# Patient Record
Sex: MALE | Race: WHITE | NOT HISPANIC OR LATINO | URBAN - METROPOLITAN AREA
[De-identification: names, ages, dates, MRNs, and addresses within clinical notes are randomized per-mention and may not be internally consistent; named-entity substitution may affect disease eponyms.]

---

## 2017-01-13 ENCOUNTER — OUTSIDE FACILITY SERVICE (OUTPATIENT)
Dept: SURGERY | Facility: CLINIC | Age: 27
End: 2017-01-13

## 2017-01-13 ENCOUNTER — LAB REQUISITION (OUTPATIENT)
Dept: LAB | Facility: HOSPITAL | Age: 27
End: 2017-01-13

## 2017-01-13 DIAGNOSIS — Z12.11 ENCOUNTER FOR SCREENING FOR MALIGNANT NEOPLASM OF COLON: ICD-10-CM

## 2017-01-13 PROCEDURE — 88305 TISSUE EXAM BY PATHOLOGIST: CPT | Performed by: SURGERY

## 2017-01-13 PROCEDURE — 45385 COLONOSCOPY W/LESION REMOVAL: CPT | Performed by: SURGERY

## 2017-01-16 LAB
CYTO UR: NORMAL
LAB AP CASE REPORT: NORMAL
Lab: NORMAL
PATH REPORT.FINAL DX SPEC: NORMAL
PATH REPORT.GROSS SPEC: NORMAL

## 2017-09-01 ENCOUNTER — TELEPHONE (OUTPATIENT)
Dept: INTERNAL MEDICINE | Facility: CLINIC | Age: 27
End: 2017-09-01

## 2017-09-01 DIAGNOSIS — Z00.00 HEALTHCARE MAINTENANCE: Primary | ICD-10-CM

## 2017-09-01 LAB
ALBUMIN SERPL-MCNC: 4.9 G/DL (ref 3.5–5.2)
ALBUMIN/GLOB SERPL: 1.8 G/DL
ALP SERPL-CCNC: 54 U/L (ref 39–117)
ALT SERPL-CCNC: 46 U/L (ref 1–41)
AST SERPL-CCNC: 23 U/L (ref 1–40)
BILIRUB SERPL-MCNC: 1.3 MG/DL (ref 0.1–1.2)
BUN SERPL-MCNC: 12 MG/DL (ref 6–20)
BUN/CREAT SERPL: 14.6 (ref 7–25)
CALCIUM SERPL-MCNC: 10.5 MG/DL (ref 8.6–10.5)
CHLORIDE SERPL-SCNC: 104 MMOL/L (ref 98–107)
CHOLEST SERPL-MCNC: 201 MG/DL (ref 0–200)
CO2 SERPL-SCNC: 24 MMOL/L (ref 22–29)
CREAT SERPL-MCNC: 0.82 MG/DL (ref 0.76–1.27)
GLOBULIN SER CALC-MCNC: 2.7 GM/DL
GLUCOSE SERPL-MCNC: 95 MG/DL (ref 65–99)
HDLC SERPL-MCNC: 29 MG/DL (ref 40–60)
LDLC SERPL CALC-MCNC: 143 MG/DL (ref 0–100)
LDLC/HDLC SERPL: 4.93 {RATIO}
POTASSIUM SERPL-SCNC: 4.4 MMOL/L (ref 3.5–5.2)
PROT SERPL-MCNC: 7.6 G/DL (ref 6–8.5)
SODIUM SERPL-SCNC: 143 MMOL/L (ref 136–145)
TRIGL SERPL-MCNC: 145 MG/DL (ref 0–150)
TSH SERPL DL<=0.005 MIU/L-ACNC: 1.54 MIU/ML (ref 0.27–4.2)
VLDLC SERPL CALC-MCNC: 29 MG/DL (ref 5–40)

## 2017-09-01 NOTE — TELEPHONE ENCOUNTER
LABS ORDERED    ----- Message from Jolly Dia MD sent at 8/31/2017  5:17 PM EDT -----  cmp flp tsh  ----- Message -----     From: Luana Cope MA     Sent: 8/31/2017   5:07 PM       To: Jolly Dia MD    PT SCHEDULED FOR LABS PLEASE ADVISE

## 2017-09-07 ENCOUNTER — OFFICE VISIT (OUTPATIENT)
Dept: INTERNAL MEDICINE | Facility: CLINIC | Age: 27
End: 2017-09-07

## 2017-09-07 ENCOUNTER — TELEPHONE (OUTPATIENT)
Dept: INTERNAL MEDICINE | Facility: CLINIC | Age: 27
End: 2017-09-07

## 2017-09-07 VITALS
SYSTOLIC BLOOD PRESSURE: 122 MMHG | WEIGHT: 244.5 LBS | HEART RATE: 91 BPM | OXYGEN SATURATION: 96 % | BODY MASS INDEX: 39.29 KG/M2 | HEIGHT: 66 IN | DIASTOLIC BLOOD PRESSURE: 68 MMHG

## 2017-09-07 DIAGNOSIS — Z00.00 HEALTH CARE MAINTENANCE: ICD-10-CM

## 2017-09-07 DIAGNOSIS — Z72.0 TOBACCO ABUSE: ICD-10-CM

## 2017-09-07 DIAGNOSIS — K57.30 DIVERTICULOSIS OF LARGE INTESTINE WITHOUT HEMORRHAGE: Primary | ICD-10-CM

## 2017-09-07 DIAGNOSIS — E66.9 OBESITY (BMI 30-39.9): ICD-10-CM

## 2017-09-07 PROCEDURE — 99213 OFFICE O/P EST LOW 20 MIN: CPT | Performed by: INTERNAL MEDICINE

## 2017-09-07 PROCEDURE — 99395 PREV VISIT EST AGE 18-39: CPT | Performed by: INTERNAL MEDICINE

## 2017-09-07 RX ORDER — BUPROPION HCL 300 MG
300 TABLET, EXTENDED RELEASE 24 HR ORAL DAILY
Qty: 30 TABLET | Refills: 5 | Status: SHIPPED | OUTPATIENT
Start: 2017-09-07 | End: 2018-10-10

## 2017-09-07 NOTE — TELEPHONE ENCOUNTER
PT AWARE    ----- Message from Jolly Dia MD sent at 9/7/2017 10:18 AM EDT -----  Let patient know.    ----- Message -----     From: Kelli Ibarra MD     Sent: 9/7/2017  10:03 AM       To: Jolly Dia MD    He could wait 3 years  It was ulcerated   ----- Message -----     From: Jolly Dia MD     Sent: 9/7/2017   9:10 AM       To: Kelli Ibarra MD    If you get a chance could you review this and see if he really needs to repeat in 2 years  Looks like the path on the polyp was mostly granulation tissue.  Just let me know    Thanks  Jolly

## 2017-09-07 NOTE — PROGRESS NOTES
"Subjective   Santosh Giordano is a 27 y.o. male and is here for a comprehensive physical exam. The patient reports problems - Obesity.    Patient is really been struggling with weight loss.  His wife cooks healthy food but when he is at work he tends to eat out a lot.  He has had weight problems his whole life  Patient also struggles with use of tobacco.  He is currently smoking 1 pack every 2-3 days.  He would like to quit this as well.   Patient was recently treated with antibiotics for diverticulitis.  He had a subsequent colonoscopy which showed a granuloma rather than a polyp.  He was told he needed a repeat colonoscopy in 20    Do you take any herbs or supplements that were not prescribed by a doctor?  Probiotics and Metamucil      Social History: Patient is seen without a lot during the day.  No regular exercise  Social History     Social History   • Marital status:      Spouse name: Lashawn Giordano   • Number of children: 0   • Years of education: N/A     Occupational History   •       Social History Main Topics   • Smoking status: Former Smoker     Packs/day: 0.25     Quit date: 7/22/2016   • Smokeless tobacco: Not on file   • Alcohol use Yes      Comment: social   • Drug use: No   • Sexual activity: Not on file     Other Topics Concern   • Not on file     Social History Narrative       Family History:   Family History   Problem Relation Age of Onset   • Heart failure Mother    • Kidney disease Mother    • Diabetes Mother    • Thyroid disease Father    • Heart disease Father    • Cancer Paternal Grandmother      breast       Past Medical History:   Past Medical History:   Diagnosis Date   • Headache            Review of Systems    A comprehensive review of systems was negative.    Objective   /68 (BP Location: Left arm, Patient Position: Sitting, Cuff Size: Thigh Adult)  Pulse 91  Ht 66\" (167.6 cm)  Wt 244 lb 8 oz (111 kg)  SpO2 96%  BMI 39.46 kg/m2    General Appearance:    Alert, " cooperative, no distress, appears stated age   Head:    Normocephalic, without obvious abnormality, atraumatic   Eyes:    PERRL, conjunctiva/corneas clear, EOM's intact, fundi     benign, both eyes        Ears:    Normal TM's and external ear canals, both ears   Nose:   Nares normal, septum midline, mucosa normal, no drainage    or sinus tenderness   Throat:   Lips, mucosa, and tongue normal; teeth and gums normal   Neck:   Supple, symmetrical, trachea midline, no adenopathy;        thyroid:  No enlargement/tenderness/nodules; no carotid    bruit or JVD   Back:     Symmetric, no curvature, ROM normal, no CVA tenderness   Lungs:     Clear to auscultation bilaterally, respirations unlabored   Chest wall:    No tenderness or deformity   Heart:    Regular rate and rhythm, S1 and S2 normal, no murmur, rub   or gallop   Abdomen:     Soft, non-tender, bowel sounds active all four quadrants,     no masses, no organomegaly           Extremities:   Extremities normal, atraumatic, no cyanosis or edema   Pulses:   2+ and symmetric all extremities   Skin:   Skin color, texture, turgor normal, no rashes or lesions   Lymph nodes:   Cervical, supraclavicular, and axillary nodes normal   Neurologic:   CNII-XII intact. Normal strength, sensation and reflexes       throughout         Medications:   Current Outpatient Prescriptions:   •  Probiotic Product (TRUBIOTICS PO), Take  by mouth., Disp: , Rfl:   •  psyllium (METAMUCIL) 58.6 % packet, Take 1 packet by mouth Daily., Disp: , Rfl:        Assessment/Plan   Healthy male exam.     1. Healthcare Maintenance:  2. Patient Counseling:  --Nutrition: Stressed importance of moderation in sodium/caffeine intake, saturated fat and cholesterol, caloric balance, sufficient intake of fresh fruits, vegetables, fiber, calcium and vit D  --Exercise: I have rec 30  Minutes a day  --Substance Abuse: smokes 1 pack every 2-3 days  --Dental health: He does Go to the dentist regularly  --Immunizations  reviewed.Up-to-date  --Discussed benefits of screening colonoscopy.There is some question as to whether or not he needs a repeat one in 2 years.  The polyp appeared to be mostly granulation tissue.  I'm sending a message to Dr. mims  3. Tob abuse- And really wants to try to quit smoking.  We discussed the options of Wellbutrin or Chantix.  We are going to go ahead and try Wellbutrin.  He'll start with 150 mg a day and then increase to 300.   4.  Obesity-patient is really going to work hard on diet and exercise.  He has good support at home from his wife.  We discussed male propping and eating more fruits and vegetables as well as streaking more water   4. HPL-again diet and exercises going to help with this

## 2017-09-07 NOTE — PATIENT INSTRUCTIONS

## 2018-03-23 PROBLEM — A09 DIARRHEA OF INFECTIOUS ORIGIN: Status: ACTIVE | Noted: 2018-03-23

## 2018-10-03 DIAGNOSIS — Z00.00 HEALTHCARE MAINTENANCE: Primary | ICD-10-CM

## 2018-10-03 LAB
ALBUMIN SERPL-MCNC: 4.6 G/DL (ref 3.5–5.2)
ALBUMIN/GLOB SERPL: 1.8 G/DL
ALP SERPL-CCNC: 61 U/L (ref 39–117)
ALT SERPL-CCNC: 39 U/L (ref 1–41)
AST SERPL-CCNC: 17 U/L (ref 1–40)
BILIRUB SERPL-MCNC: 1 MG/DL (ref 0.1–1.2)
BUN SERPL-MCNC: 10 MG/DL (ref 6–20)
BUN/CREAT SERPL: 11.1 (ref 7–25)
CALCIUM SERPL-MCNC: 9.6 MG/DL (ref 8.6–10.5)
CHLORIDE SERPL-SCNC: 103 MMOL/L (ref 98–107)
CHOLEST SERPL-MCNC: 196 MG/DL (ref 0–200)
CO2 SERPL-SCNC: 23.8 MMOL/L (ref 22–29)
CREAT SERPL-MCNC: 0.9 MG/DL (ref 0.76–1.27)
GLOBULIN SER CALC-MCNC: 2.5 GM/DL
GLUCOSE SERPL-MCNC: 99 MG/DL (ref 65–99)
HDLC SERPL-MCNC: 35 MG/DL (ref 40–60)
LDLC SERPL CALC-MCNC: 135 MG/DL (ref 0–100)
LDLC/HDLC SERPL: 3.87 {RATIO}
POTASSIUM SERPL-SCNC: 4.3 MMOL/L (ref 3.5–5.2)
PROT SERPL-MCNC: 7.1 G/DL (ref 6–8.5)
SODIUM SERPL-SCNC: 141 MMOL/L (ref 136–145)
TRIGL SERPL-MCNC: 128 MG/DL (ref 0–150)
TSH SERPL DL<=0.005 MIU/L-ACNC: 1.54 MIU/ML (ref 0.27–4.2)
VLDLC SERPL CALC-MCNC: 25.6 MG/DL (ref 5–40)

## 2018-10-10 ENCOUNTER — OFFICE VISIT (OUTPATIENT)
Dept: INTERNAL MEDICINE | Facility: CLINIC | Age: 28
End: 2018-10-10

## 2018-10-10 VITALS
HEIGHT: 66 IN | TEMPERATURE: 98.1 F | BODY MASS INDEX: 40.34 KG/M2 | SYSTOLIC BLOOD PRESSURE: 120 MMHG | OXYGEN SATURATION: 98 % | DIASTOLIC BLOOD PRESSURE: 82 MMHG | HEART RATE: 84 BPM | WEIGHT: 251 LBS

## 2018-10-10 DIAGNOSIS — K21.9 GASTROESOPHAGEAL REFLUX DISEASE, ESOPHAGITIS PRESENCE NOT SPECIFIED: ICD-10-CM

## 2018-10-10 DIAGNOSIS — Z00.00 HEALTH CARE MAINTENANCE: Primary | ICD-10-CM

## 2018-10-10 DIAGNOSIS — M54.2 NECK PAIN: ICD-10-CM

## 2018-10-10 PROCEDURE — 90471 IMMUNIZATION ADMIN: CPT | Performed by: INTERNAL MEDICINE

## 2018-10-10 PROCEDURE — 99395 PREV VISIT EST AGE 18-39: CPT | Performed by: INTERNAL MEDICINE

## 2018-10-10 PROCEDURE — 90674 CCIIV4 VAC NO PRSV 0.5 ML IM: CPT | Performed by: INTERNAL MEDICINE

## 2018-10-10 RX ORDER — OMEPRAZOLE 40 MG/1
40 CAPSULE, DELAYED RELEASE ORAL DAILY
Qty: 30 CAPSULE | Refills: 2 | Status: SHIPPED | OUTPATIENT
Start: 2018-10-10 | End: 2019-05-08

## 2018-10-10 NOTE — PROGRESS NOTES
"Subjective   Santosh Giordano is a 28 y.o. male and is here for a comprehensive physical exam. The patient reports problems - obesity.    HE is cont to have trouble with weight and smoking.    Pt has been having some epigastric pain mostly after meals    He does have neck and upper back pain  Related to work  Do you take any herbs or supplements that were not prescribed by a doctor? probiotic      Social History: 2 ppw  He does not exercise reg  Does not eat a healthy diet    Social History     Social History   • Marital status:      Spouse name: Lashawn Giordano   • Number of children: 0   • Years of education: N/A     Occupational History   •       Social History Main Topics   • Smoking status: Former Smoker     Packs/day: 0.25     Quit date: 7/22/2016   • Smokeless tobacco: Current User   • Alcohol use Yes      Comment: social   • Drug use: No   • Sexual activity: Not on file     Other Topics Concern   • Not on file     Social History Narrative   • No narrative on file       Family History:   Family History   Problem Relation Age of Onset   • Heart failure Mother    • Kidney disease Mother    • Diabetes Mother    • Thyroid disease Father    • Heart disease Father    • Cancer Paternal Grandmother         breast       Past Medical History:   Past Medical History:   Diagnosis Date   • Headache            Review of Systems    A comprehensive review of systems was negative.    Objective   /82 (BP Location: Left arm, Patient Position: Sitting, Cuff Size: Adult)   Pulse 84   Temp 98.1 °F (36.7 °C) (Oral)   Ht 167.6 cm (66\")   Wt 114 kg (251 lb)   SpO2 98%   BMI 40.51 kg/m²     General Appearance:    Alert, cooperative, no distress, appears stated age   Head:    Normocephalic, without obvious abnormality, atraumatic   Eyes:    PERRL, conjunctiva/corneas clear, EOM's intact, fundi     benign, both eyes        Ears:    Normal TM's and external ear canals, both ears   Nose:   Nares normal, septum " midline, mucosa normal, no drainage    or sinus tenderness   Throat:   Lips, mucosa, and tongue normal; teeth and gums normal   Neck:   Supple, symmetrical, trachea midline, no adenopathy;        thyroid:  No enlargement/tenderness/nodules; no carotid    bruit or JVD   Back:     Symmetric, no curvature, ROM normal, no CVA tenderness   Lungs:     Clear to auscultation bilaterally, respirations unlabored   Chest wall:    No tenderness or deformity   Heart:    Regular rate and rhythm, S1 and S2 normal, no murmur, rub   or gallop   Abdomen:     Soft, non-tender, bowel sounds active all four quadrants,     no masses, no organomegaly           Extremities:   Extremities normal, atraumatic, no cyanosis or edema   Pulses:   2+ and symmetric all extremities   Skin:   Skin color, texture, turgor normal, no rashes or lesions   Lymph nodes:   Cervical, supraclavicular, and axillary nodes normal   Neurologic:   CNII-XII intact. Normal strength, sensation and reflexes       throughout       Medications:   Current Outpatient Prescriptions:   •  Probiotic Product (TRUBIOTICS PO), Take  by mouth., Disp: , Rfl:   •  WELLBUTRIN  MG 24 hr tablet, Take 1 tablet by mouth Daily., Disp: 30 tablet, Rfl: 5       Assessment/Plan   Healthy male exam.      1. Healthcare Maintenance:  2. Patient Counseling:  --Nutrition: Stressed importance of moderation in sodium/caffeine intake, saturated fat and cholesterol, caloric balance, sufficient intake of fresh fruits, vegetables, fiber, calcium and vit D  --Exercise: he does not exercise  --Substance Abuse: he does smoke rare etoh  --Dental health: He does go to the dentist reg  --Immunizations reviewed.  --Discussed benefits of screening colonoscopy.  3.  GERD-  We are going to try a ppi  4.  NEck and upper back pain-  I have rec some exercises and referred to PT  5.  Tob abuse-  He is working on quitting

## 2019-05-08 ENCOUNTER — HOSPITAL ENCOUNTER (OUTPATIENT)
Dept: GENERAL RADIOLOGY | Facility: HOSPITAL | Age: 29
Discharge: HOME | End: 2019-05-08
Admitting: INTERNAL MEDICINE

## 2019-05-08 ENCOUNTER — OFFICE VISIT (OUTPATIENT)
Dept: INTERNAL MEDICINE | Facility: CLINIC | Age: 29
End: 2019-05-08

## 2019-05-08 VITALS
TEMPERATURE: 98.4 F | SYSTOLIC BLOOD PRESSURE: 122 MMHG | HEIGHT: 66 IN | OXYGEN SATURATION: 97 % | WEIGHT: 247.3 LBS | DIASTOLIC BLOOD PRESSURE: 82 MMHG | BODY MASS INDEX: 39.74 KG/M2 | HEART RATE: 108 BPM

## 2019-05-08 DIAGNOSIS — K21.9 GASTROESOPHAGEAL REFLUX DISEASE, ESOPHAGITIS PRESENCE NOT SPECIFIED: Primary | ICD-10-CM

## 2019-05-08 DIAGNOSIS — R07.1 CHEST PAIN ON BREATHING: ICD-10-CM

## 2019-05-08 PROCEDURE — 93000 ELECTROCARDIOGRAM COMPLETE: CPT | Performed by: INTERNAL MEDICINE

## 2019-05-08 PROCEDURE — 99214 OFFICE O/P EST MOD 30 MIN: CPT | Performed by: INTERNAL MEDICINE

## 2019-05-08 PROCEDURE — 71046 X-RAY EXAM CHEST 2 VIEWS: CPT

## 2019-05-08 RX ORDER — MELOXICAM 7.5 MG/1
7.5 TABLET ORAL DAILY
Qty: 30 TABLET | Refills: 0 | Status: SHIPPED | OUTPATIENT
Start: 2019-05-08 | End: 2019-11-06

## 2019-05-08 RX ORDER — OMEPRAZOLE 40 MG/1
40 CAPSULE, DELAYED RELEASE ORAL DAILY
Qty: 30 CAPSULE | Refills: 5 | Status: SHIPPED | OUTPATIENT
Start: 2019-05-08 | End: 2019-11-06

## 2019-05-08 NOTE — PROGRESS NOTES
Subjective   Santosh Giordano is a 28 y.o. male c/o sharp chest pain, sob on and off and rt shoulder pain X 2 weeks.    History of Present Illness   PT has had some int sharp CP and SOB int for the last couple weeks.  No wheezing   Worse with movement and breathing in and out  He is still smoking but he is trying to quit.  He does have daily reflux sx  He is not very compliant with his meds      The following portions of the patient's history were reviewed and updated as appropriate: allergies, current medications, past medical history, past social history and problem list.  Patient denies any change in diet.  He is not very compliant with his proton pump inhibitor.  He has been trying to quit smoking    Review of Systems   All other systems reviewed and are negative.      Objective   Physical Exam   Constitutional: He is oriented to person, place, and time. He appears well-developed and well-nourished.   HENT:   Head: Normocephalic and atraumatic.   Right Ear: External ear normal.   Left Ear: External ear normal.   Mouth/Throat: Oropharynx is clear and moist.   Eyes: Conjunctivae and EOM are normal. Pupils are equal, round, and reactive to light.   Neck: Normal range of motion. No tracheal deviation present. No thyromegaly present.   Cardiovascular: Normal rate, regular rhythm, normal heart sounds and intact distal pulses.   Pulmonary/Chest: Effort normal and breath sounds normal.   Abdominal: Soft. Bowel sounds are normal. He exhibits no distension. There is no tenderness.   Musculoskeletal: Normal range of motion. He exhibits no edema or deformity.   Some pain with palpation to the right lower sternum.  This is the same pain he has been having.  He also has the same pain when he takes a deep inspiration   Neurological: He is alert and oriented to person, place, and time.   Skin: Skin is warm and dry.   Psychiatric: He has a normal mood and affect. His behavior is normal. Judgment and thought content normal.   Vitals  reviewed.      Vitals:    05/08/19 0818   BP: 122/82   Pulse: 108   Temp: 98.4 °F (36.9 °C)   SpO2: 97%     Current Outpatient Medications:   •  Probiotic Product (TRUBIOTICS PO), Take  by mouth., Disp: , Rfl:      EKG-  NSR  No acute ST changes  No sig change from 2016      Assessment/Plan   Problems Addressed this Visit        Digestive    Acid reflux - Primary      Other Visit Diagnoses     Chest pain on breathing        Relevant Orders    XR Chest PA & Lateral    CBC & Differential    D-dimer, Quantitative          1.  Chest pain: EKG is normal.  O2 sat is completely normal.  We will go ahead and check some labs including a d-dimer today and a chest x-ray.  In the meantime we will go ahead and put him on reflux medication and anti-inflammatory.  I think this is a combination of acid reflux with either pleurisy or costochondritis

## 2019-05-22 LAB
BASOPHILS # BLD AUTO: 0.02 10E3/MM3 (ref 0–0.2)
BASOPHILS NFR BLD AUTO: 0.3 % (ref 0–1.5)
D DIMER PPP FEU-MCNC: <0.27 MCGFEU/ML (ref 0–0.49)
EOSINOPHIL # BLD AUTO: 0.3 10E3/MM3 (ref 0–0.4)
EOSINOPHIL NFR BLD AUTO: 4.2 % (ref 0.3–6.2)
ERYTHROCYTE [DISTWIDTH] IN BLOOD BY AUTOMATED COUNT: 12.9 % (ref 12.3–15.4)
HCT VFR BLD AUTO: 45.6 % (ref 37.5–51)
HGB BLD-MCNC: 16.1 G/DL (ref 13–17.7)
IMM GRANULOCYTES NFR BLD AUTO: 0.4 % (ref 0–0.5)
LYMPHOCYTES # BLD AUTO: 1.87 10E3/MM3 (ref 0.7–3.1)
LYMPHOCYTES NFR BLD AUTO: 26.2 % (ref 19.6–45.3)
MCH RBC QN AUTO: 32.2 PG (ref 26.6–33)
MCHC RBC AUTO-ENTMCNC: 35.3 G/DL (ref 31.5–35.7)
MCV RBC AUTO: 91.2 FL (ref 79–97)
MONOCYTES # BLD AUTO: 0.48 10E3/MM3 (ref 0.1–0.9)
MONOCYTES NFR BLD AUTO: 6.7 % (ref 5–12)
NEUTROPHILS # BLD AUTO: 4.45 10E3/MM3 (ref 1.7–7)
NEUTROPHILS NFR BLD AUTO: 62.2 % (ref 42.7–76)
PLATELET # BLD AUTO: 184 10E3/MM3 (ref 140–450)
RBC # BLD AUTO: 5 10E6/MM3 (ref 4.14–5.8)
WBC # BLD AUTO: 7.15 10E3/MM3 (ref 3.4–10.8)

## 2019-10-30 DIAGNOSIS — Z00.00 HEALTH CARE MAINTENANCE: Primary | ICD-10-CM

## 2019-10-31 LAB
ALBUMIN SERPL-MCNC: 4.6 G/DL (ref 3.5–5.2)
ALBUMIN/GLOB SERPL: 2.2 G/DL
ALP SERPL-CCNC: 64 U/L (ref 39–117)
ALT SERPL-CCNC: 100 U/L (ref 1–41)
AST SERPL-CCNC: 38 U/L (ref 1–40)
BILIRUB SERPL-MCNC: 0.8 MG/DL (ref 0.2–1.2)
BUN SERPL-MCNC: 10 MG/DL (ref 6–20)
BUN/CREAT SERPL: 12.5 (ref 7–25)
CALCIUM SERPL-MCNC: 9.4 MG/DL (ref 8.6–10.5)
CHLORIDE SERPL-SCNC: 105 MMOL/L (ref 98–107)
CHOLEST SERPL-MCNC: 182 MG/DL (ref 0–200)
CO2 SERPL-SCNC: 24.1 MMOL/L (ref 22–29)
CREAT SERPL-MCNC: 0.8 MG/DL (ref 0.76–1.27)
GLOBULIN SER CALC-MCNC: 2.1 GM/DL
GLUCOSE SERPL-MCNC: 96 MG/DL (ref 65–99)
HDLC SERPL-MCNC: 36 MG/DL (ref 40–60)
LDLC SERPL CALC-MCNC: 128 MG/DL (ref 0–100)
LDLC/HDLC SERPL: 3.56 {RATIO}
POTASSIUM SERPL-SCNC: 4.2 MMOL/L (ref 3.5–5.2)
PROT SERPL-MCNC: 6.7 G/DL (ref 6–8.5)
SODIUM SERPL-SCNC: 141 MMOL/L (ref 136–145)
TRIGL SERPL-MCNC: 90 MG/DL (ref 0–150)
TSH SERPL DL<=0.005 MIU/L-ACNC: 1.83 UIU/ML (ref 0.27–4.2)
VLDLC SERPL CALC-MCNC: 18 MG/DL

## 2019-11-06 ENCOUNTER — HOSPITAL ENCOUNTER (OUTPATIENT)
Dept: GENERAL RADIOLOGY | Facility: HOSPITAL | Age: 29
Discharge: HOME | End: 2019-11-06
Admitting: INTERNAL MEDICINE

## 2019-11-06 ENCOUNTER — OFFICE VISIT (OUTPATIENT)
Dept: INTERNAL MEDICINE | Facility: CLINIC | Age: 29
End: 2019-11-06

## 2019-11-06 VITALS
SYSTOLIC BLOOD PRESSURE: 124 MMHG | TEMPERATURE: 98.4 F | HEART RATE: 95 BPM | WEIGHT: 249.2 LBS | DIASTOLIC BLOOD PRESSURE: 76 MMHG | HEIGHT: 66 IN | BODY MASS INDEX: 40.05 KG/M2 | OXYGEN SATURATION: 95 %

## 2019-11-06 DIAGNOSIS — Z00.00 HEALTH CARE MAINTENANCE: Primary | ICD-10-CM

## 2019-11-06 DIAGNOSIS — M79.661 RIGHT CALF PAIN: ICD-10-CM

## 2019-11-06 DIAGNOSIS — R07.9 CHEST PAIN, UNSPECIFIED TYPE: ICD-10-CM

## 2019-11-06 PROBLEM — A09 DIARRHEA OF INFECTIOUS ORIGIN: Status: RESOLVED | Noted: 2018-03-23 | Resolved: 2019-11-06

## 2019-11-06 PROCEDURE — 90674 CCIIV4 VAC NO PRSV 0.5 ML IM: CPT | Performed by: INTERNAL MEDICINE

## 2019-11-06 PROCEDURE — 90471 IMMUNIZATION ADMIN: CPT | Performed by: INTERNAL MEDICINE

## 2019-11-06 PROCEDURE — 99395 PREV VISIT EST AGE 18-39: CPT | Performed by: INTERNAL MEDICINE

## 2019-11-06 PROCEDURE — 71046 X-RAY EXAM CHEST 2 VIEWS: CPT

## 2019-11-06 PROCEDURE — 99213 OFFICE O/P EST LOW 20 MIN: CPT | Performed by: INTERNAL MEDICINE

## 2019-11-06 RX ORDER — ALBUTEROL SULFATE 90 UG/1
2 AEROSOL, METERED RESPIRATORY (INHALATION) 2 TIMES DAILY
COMMUNITY
Start: 2019-10-24 | End: 2020-03-10

## 2019-11-06 NOTE — PROGRESS NOTES
"Subjective   Santosh Giordano is a 29 y.o. male and is here for a comprehensive physical exam. The patient reports problems - chest pain.    He was seen in the [facility] recently for a URI and he was treated with steroids and abx  He got better but since then he has had CP on the right side  No SOB not related to activty not related to deep insp   Anytime day or night it can occurred  No orthopnea no pnd no LE edema  No sig gerd sx  He has been having cramping in the right calf  He does say it can be worse after standing a log time  No swelling .        Do you take any herbs or supplements that were not prescribed by a doctor? Prn probiotic      Social History: no reg exercise  He has not been eating well as he does eat out a lot with work    Social History     Socioeconomic History   • Marital status:      Spouse name: Lashawn Giordano   • Number of children: 0   • Years of education: Not on file   • Highest education level: Not on file   Occupational History   • Occupation:    Tobacco Use   • Smoking status: Former Smoker     Packs/day: 0.25     Last attempt to quit: 7/22/2016     Years since quitting: 3.2   • Smokeless tobacco: Current User   Substance and Sexual Activity   • Alcohol use: Yes     Comment: social   • Drug use: No       Family History:   Family History   Problem Relation Age of Onset   • Heart failure Mother    • Kidney disease Mother    • Diabetes Mother    • Thyroid disease Father    • Heart disease Father    • Cancer Paternal Grandmother         breast       Past Medical History:   Past Medical History:   Diagnosis Date   • Headache            Review of Systems    A comprehensive review of systems was negative.    Objective   /76 (BP Location: Left arm, Patient Position: Sitting)   Pulse 95   Temp 98.4 °F (36.9 °C) (Oral)   Ht 167.6 cm (66\")   Wt 113 kg (249 lb 3.2 oz)   SpO2 95%   BMI 40.22 kg/m²     General Appearance:    Alert, cooperative, no distress, appears stated age "   Head:    Normocephalic, without obvious abnormality, atraumatic   Eyes:    PERRL, conjunctiva/corneas clear, EOM's intact, fundi     benign, both eyes        Ears:    Normal TM's and external ear canals, both ears   Nose:   Nares normal, septum midline, mucosa normal, no drainage    or sinus tenderness   Throat:   Lips, mucosa, and tongue normal; teeth and gums normal   Neck:   Supple, symmetrical, trachea midline, no adenopathy;        thyroid:  No enlargement/tenderness/nodules; no carotid    bruit or JVD   Back:     Symmetric, no curvature, ROM normal, no CVA tenderness   Lungs:     Clear to auscultation bilaterally, respirations unlabored   Chest wall:    No tenderness or deformity   Heart:    Regular rate and rhythm, S1 and S2 normal, no murmur, rub   or gallop   Abdomen:     Soft, non-tender, bowel sounds active all four quadrants,     no masses, no organomegaly           Extremities:   Extremities normal, atraumatic, no cyanosis or edema   Pulses:   2+ and symmetric all extremities   Skin:   Skin color, texture, turgor normal, no rashes or lesions   Lymph nodes:   Cervical, supraclavicular, and axillary nodes normal   Neurologic:   CNII-XII intact. Normal strength, sensation and reflexes       throughout       Medications:   Current Outpatient Medications:   •  Probiotic Product (TRUBIOTICS PO), Take  by mouth., Disp: , Rfl:   •  VENTOLIN  (90 Base) MCG/ACT inhaler, 2 puffs 2 (Two) Times a Day., Disp: , Rfl:        Assessment/Plan   Healthy male exam.     1. Healthcare Maintenance:  2. Patient Counseling:  --Nutrition: Stressed importance of moderation in sodium/caffeine intake, saturated fat and cholesterol, caloric balance, sufficient intake of fresh fruits, vegetables, fiber, calcium and vit D  --Exercise: .   --Substance Abuse:   --Dental health:   --Immunizations reviewed.  --Discussed benefits of screening colonoscopy.  3. URI- sx resolved with abx and steroids  4.  Right sided CP-  Nothing on  exam  He did recently have a URI so I will check a CXR  If cont he will let me know  5.  Right calf pain-  rec mag and staying hydrated

## 2020-03-10 ENCOUNTER — OFFICE VISIT (OUTPATIENT)
Dept: INTERNAL MEDICINE | Facility: CLINIC | Age: 30
End: 2020-03-10

## 2020-03-10 VITALS
TEMPERATURE: 98.7 F | SYSTOLIC BLOOD PRESSURE: 122 MMHG | WEIGHT: 252 LBS | OXYGEN SATURATION: 98 % | HEART RATE: 85 BPM | BODY MASS INDEX: 40.5 KG/M2 | DIASTOLIC BLOOD PRESSURE: 70 MMHG | HEIGHT: 66 IN

## 2020-03-10 DIAGNOSIS — R10.11 RUQ ABDOMINAL PAIN: Primary | ICD-10-CM

## 2020-03-10 LAB
BILIRUB BLD-MCNC: NEGATIVE MG/DL
CLARITY, POC: CLEAR
COLOR UR: YELLOW
GLUCOSE UR STRIP-MCNC: NEGATIVE MG/DL
KETONES UR QL: NEGATIVE
LEUKOCYTE EST, POC: NEGATIVE
NITRITE UR-MCNC: NEGATIVE MG/ML
PH UR: 5.5 [PH] (ref 5–8)
PROT UR STRIP-MCNC: NEGATIVE MG/DL
RBC # UR STRIP: NEGATIVE /UL
SP GR UR: 1.03 (ref 1–1.03)
UROBILINOGEN UR QL: NORMAL

## 2020-03-10 PROCEDURE — 99214 OFFICE O/P EST MOD 30 MIN: CPT | Performed by: INTERNAL MEDICINE

## 2020-03-10 PROCEDURE — 81003 URINALYSIS AUTO W/O SCOPE: CPT | Performed by: INTERNAL MEDICINE

## 2020-03-10 RX ORDER — OMEPRAZOLE 40 MG/1
40 CAPSULE, DELAYED RELEASE ORAL DAILY
Qty: 30 CAPSULE | Refills: 2 | Status: SHIPPED | OUTPATIENT
Start: 2020-03-10 | End: 2020-07-16 | Stop reason: SDUPTHER

## 2020-03-10 NOTE — PROGRESS NOTES
Subjective   Santosh Giordano is a 29 y.o. male c/o rt lower back pain and less bowel movements and constipation X 1.5 week.    History of Present Illness   He has been having some right flank pain for 10 days  Worse after he eats  And at night  De has been eating more greens    He does have some slight   He was on a abx for a uti and root canal for almost a month  No diarrhea with this    The following portions of the patient's history were reviewed and updated as appropriate: allergies, current medications, past medical history, past social history and problem list.  He has not been going to the gym  Eating healthier    Review of Systems   All other systems reviewed and are negative.      Objective   Physical Exam   Constitutional: He is oriented to person, place, and time. He appears well-developed and well-nourished.   HENT:   Head: Normocephalic and atraumatic.   Right Ear: External ear normal.   Left Ear: External ear normal.   Mouth/Throat: Oropharynx is clear and moist.   Eyes: Pupils are equal, round, and reactive to light. Conjunctivae and EOM are normal.   Neck: Normal range of motion. No tracheal deviation present. No thyromegaly present.   Cardiovascular: Normal rate, regular rhythm, normal heart sounds and intact distal pulses.   Pulmonary/Chest: Effort normal and breath sounds normal.   Abdominal: Soft. Bowel sounds are normal. He exhibits no distension. There is no tenderness.   Musculoskeletal: Normal range of motion. He exhibits no edema or deformity.   Neurological: He is alert and oriented to person, place, and time.   Skin: Skin is warm and dry.   Psychiatric: He has a normal mood and affect. His behavior is normal. Judgment and thought content normal.   Vitals reviewed.      Vitals:    03/10/20 1318   BP: 122/70   Pulse: 85   Temp: 98.7 °F (37.1 °C)   SpO2: 98%     Current Outpatient Medications:   •  Probiotic Product (TRUBIOTICS PO), Take  by mouth., Disp: , Rfl:   •  omeprazole (priLOSEC) 40 MG  capsule, Take 1 capsule by mouth Daily., Disp: 30 capsule, Rfl: 2    Body mass index is 40.67 kg/m².         Assessment/Plan   Diagnoses and all orders for this visit:    RUQ abdominal pain  -     omeprazole (priLOSEC) 40 MG capsule; Take 1 capsule by mouth Daily.  -     US Gallbladder  -     CBC & Differential  -     Comprehensive Metabolic Panel    1.  Right flank pain after meal-  With some episogtric pain   We will check US and try PPI then perhaps a hida  Depending on how he feels  May need hida if sx cont   And watch diet closely

## 2020-03-11 LAB
ALBUMIN SERPL-MCNC: 4.5 G/DL (ref 4.1–5.2)
ALBUMIN/GLOB SERPL: 1.9 {RATIO} (ref 1.2–2.2)
ALP SERPL-CCNC: 65 IU/L (ref 39–117)
ALT SERPL-CCNC: 47 IU/L (ref 0–44)
AST SERPL-CCNC: 25 IU/L (ref 0–40)
BASOPHILS # BLD AUTO: 0 X10E3/UL (ref 0–0.2)
BASOPHILS NFR BLD AUTO: 0 %
BILIRUB SERPL-MCNC: 0.7 MG/DL (ref 0–1.2)
BUN SERPL-MCNC: 9 MG/DL (ref 6–20)
BUN/CREAT SERPL: 10 (ref 9–20)
CALCIUM SERPL-MCNC: 9.5 MG/DL (ref 8.7–10.2)
CHLORIDE SERPL-SCNC: 108 MMOL/L (ref 96–106)
CO2 SERPL-SCNC: 21 MMOL/L (ref 20–29)
CREAT SERPL-MCNC: 0.94 MG/DL (ref 0.76–1.27)
EOSINOPHIL # BLD AUTO: 0.4 X10E3/UL (ref 0–0.4)
EOSINOPHIL NFR BLD AUTO: 4 %
ERYTHROCYTE [DISTWIDTH] IN BLOOD BY AUTOMATED COUNT: 13.1 % (ref 11.6–15.4)
GLOBULIN SER CALC-MCNC: 2.4 G/DL (ref 1.5–4.5)
GLUCOSE SERPL-MCNC: 102 MG/DL (ref 65–99)
HCT VFR BLD AUTO: 46.3 % (ref 37.5–51)
HGB BLD-MCNC: 16.2 G/DL (ref 13–17.7)
IMM GRANULOCYTES # BLD AUTO: 0 X10E3/UL (ref 0–0.1)
IMM GRANULOCYTES NFR BLD AUTO: 0 %
LYMPHOCYTES # BLD AUTO: 2.2 X10E3/UL (ref 0.7–3.1)
LYMPHOCYTES NFR BLD AUTO: 27 %
MCH RBC QN AUTO: 32.1 PG (ref 26.6–33)
MCHC RBC AUTO-ENTMCNC: 35 G/DL (ref 31.5–35.7)
MCV RBC AUTO: 92 FL (ref 79–97)
MONOCYTES # BLD AUTO: 0.4 X10E3/UL (ref 0.1–0.9)
MONOCYTES NFR BLD AUTO: 5 %
NEUTROPHILS # BLD AUTO: 5.1 X10E3/UL (ref 1.4–7)
NEUTROPHILS NFR BLD AUTO: 64 %
PLATELET # BLD AUTO: 215 X10E3/UL (ref 150–450)
POTASSIUM SERPL-SCNC: 4.2 MMOL/L (ref 3.5–5.2)
PROT SERPL-MCNC: 6.9 G/DL (ref 6–8.5)
RBC # BLD AUTO: 5.05 X10E6/UL (ref 4.14–5.8)
SODIUM SERPL-SCNC: 143 MMOL/L (ref 134–144)
WBC # BLD AUTO: 8.2 X10E3/UL (ref 3.4–10.8)

## 2020-03-13 ENCOUNTER — TELEPHONE (OUTPATIENT)
Dept: INTERNAL MEDICINE | Facility: CLINIC | Age: 30
End: 2020-03-13

## 2020-03-13 NOTE — TELEPHONE ENCOUNTER
I CALLED HIM TWICE TO KNOW WHY HE CANCELL OR JUST TO DISCUSS BUT COULDN'T GET HOLD OF HIM.      ----- Message from Beba Farias sent at 3/13/2020 12:28 PM EDT -----  Regarding: ORDER  I was informed by Centralized Scheduling that patient did not want to have his gallbladder US done.  Order cancelled.

## 2020-03-16 ENCOUNTER — APPOINTMENT (OUTPATIENT)
Dept: ULTRASOUND IMAGING | Facility: HOSPITAL | Age: 30
End: 2020-03-16

## 2020-03-16 ENCOUNTER — TELEPHONE (OUTPATIENT)
Dept: INTERNAL MEDICINE | Facility: CLINIC | Age: 30
End: 2020-03-16

## 2020-03-16 NOTE — TELEPHONE ENCOUNTER
ORDER CANCELLED    ----- Message from Beba Farias sent at 3/13/2020 12:28 PM EDT -----  Regarding: ORDER  I was informed by Centralized Scheduling that patient did not want to have his gallbladder US done.  Order cancelled.

## 2020-07-16 ENCOUNTER — TELEPHONE (OUTPATIENT)
Dept: INTERNAL MEDICINE | Facility: CLINIC | Age: 30
End: 2020-07-16

## 2020-07-16 DIAGNOSIS — R10.11 RUQ ABDOMINAL PAIN: ICD-10-CM

## 2020-07-16 RX ORDER — OMEPRAZOLE 40 MG/1
40 CAPSULE, DELAYED RELEASE ORAL DAILY
Qty: 30 CAPSULE | Refills: 5 | Status: SHIPPED | OUTPATIENT
Start: 2020-07-16 | End: 2020-12-02 | Stop reason: SDUPTHER

## 2020-07-16 NOTE — TELEPHONE ENCOUNTER
RX SENT TO PHARMACY      ----- Message from Beba Goss Rep sent at 7/16/2020  1:12 PM EDT -----  Patient needs refill RX sent to [facility] for:    omeprazole (priLOSEC) 40 MG capsule

## 2020-11-06 ENCOUNTER — RESULTS ENCOUNTER (OUTPATIENT)
Dept: INTERNAL MEDICINE | Facility: CLINIC | Age: 30
End: 2020-11-06

## 2020-11-06 DIAGNOSIS — Z00.00 HEALTH CARE MAINTENANCE: ICD-10-CM

## 2020-11-06 DIAGNOSIS — Z00.00 HEALTH CARE MAINTENANCE: Primary | ICD-10-CM

## 2020-11-12 LAB
25(OH)D3+25(OH)D2 SERPL-MCNC: 25.6 NG/ML (ref 30–100)
ALBUMIN SERPL-MCNC: 4.4 G/DL (ref 3.5–5.2)
ALBUMIN/GLOB SERPL: 2.2 G/DL
ALP SERPL-CCNC: 64 U/L (ref 39–117)
ALT SERPL-CCNC: 55 U/L (ref 1–41)
AST SERPL-CCNC: 20 U/L (ref 1–40)
BASOPHILS # BLD AUTO: 0.03 10*3/MM3 (ref 0–0.2)
BASOPHILS NFR BLD AUTO: 0.4 % (ref 0–1.5)
BILIRUB SERPL-MCNC: 0.6 MG/DL (ref 0–1.2)
BUN SERPL-MCNC: 12 MG/DL (ref 6–20)
BUN/CREAT SERPL: 14.5 (ref 7–25)
CALCIUM SERPL-MCNC: 9.5 MG/DL (ref 8.6–10.5)
CHLORIDE SERPL-SCNC: 108 MMOL/L (ref 98–107)
CHOLEST SERPL-MCNC: 192 MG/DL (ref 0–200)
CO2 SERPL-SCNC: 23.7 MMOL/L (ref 22–29)
CREAT SERPL-MCNC: 0.83 MG/DL (ref 0.76–1.27)
EOSINOPHIL # BLD AUTO: 0.38 10*3/MM3 (ref 0–0.4)
EOSINOPHIL NFR BLD AUTO: 5.2 % (ref 0.3–6.2)
ERYTHROCYTE [DISTWIDTH] IN BLOOD BY AUTOMATED COUNT: 12.7 % (ref 12.3–15.4)
FT4I SERPL CALC-MCNC: 1.9 (ref 1.2–4.9)
GLOBULIN SER CALC-MCNC: 2 GM/DL
GLUCOSE SERPL-MCNC: 87 MG/DL (ref 65–99)
HCT VFR BLD AUTO: 48 % (ref 37.5–51)
HCV AB S/CO SERPL IA: <0.1 S/CO RATIO (ref 0–0.9)
HDLC SERPL-MCNC: 33 MG/DL (ref 40–60)
HGB BLD-MCNC: 16.5 G/DL (ref 13–17.7)
IMM GRANULOCYTES # BLD AUTO: 0.02 10*3/MM3 (ref 0–0.05)
IMM GRANULOCYTES NFR BLD AUTO: 0.3 % (ref 0–0.5)
LDLC SERPL CALC-MCNC: 136 MG/DL (ref 0–100)
LDLC/HDLC SERPL: 4.05 {RATIO}
LYMPHOCYTES # BLD AUTO: 2.23 10*3/MM3 (ref 0.7–3.1)
LYMPHOCYTES NFR BLD AUTO: 30.3 % (ref 19.6–45.3)
MCH RBC QN AUTO: 32.5 PG (ref 26.6–33)
MCHC RBC AUTO-ENTMCNC: 34.4 G/DL (ref 31.5–35.7)
MCV RBC AUTO: 94.5 FL (ref 79–97)
MONOCYTES # BLD AUTO: 0.47 10*3/MM3 (ref 0.1–0.9)
MONOCYTES NFR BLD AUTO: 6.4 % (ref 5–12)
NEUTROPHILS # BLD AUTO: 4.23 10*3/MM3 (ref 1.7–7)
NEUTROPHILS NFR BLD AUTO: 57.4 % (ref 42.7–76)
NRBC BLD AUTO-RTO: 0 /100 WBC (ref 0–0.2)
PLATELET # BLD AUTO: 201 10*3/MM3 (ref 140–450)
POTASSIUM SERPL-SCNC: 4.5 MMOL/L (ref 3.5–5.2)
PROT SERPL-MCNC: 6.4 G/DL (ref 6–8.5)
RBC # BLD AUTO: 5.08 10*6/MM3 (ref 4.14–5.8)
SODIUM SERPL-SCNC: 142 MMOL/L (ref 136–145)
T3RU NFR SERPL: 26 % (ref 24–39)
T4 SERPL-MCNC: 7.4 UG/DL (ref 4.5–12)
TRIGL SERPL-MCNC: 127 MG/DL (ref 0–150)
TSH SERPL DL<=0.005 MIU/L-ACNC: 1.4 UIU/ML (ref 0.45–4.5)
VLDLC SERPL CALC-MCNC: 23 MG/DL (ref 5–40)
WBC # BLD AUTO: 7.36 10*3/MM3 (ref 3.4–10.8)

## 2020-12-02 ENCOUNTER — OFFICE VISIT (OUTPATIENT)
Dept: INTERNAL MEDICINE | Facility: CLINIC | Age: 30
End: 2020-12-02

## 2020-12-02 VITALS
DIASTOLIC BLOOD PRESSURE: 94 MMHG | BODY MASS INDEX: 40.26 KG/M2 | WEIGHT: 250.5 LBS | OXYGEN SATURATION: 97 % | HEART RATE: 98 BPM | SYSTOLIC BLOOD PRESSURE: 128 MMHG | HEIGHT: 66 IN

## 2020-12-02 DIAGNOSIS — Z00.00 HEALTH CARE MAINTENANCE: Primary | ICD-10-CM

## 2020-12-02 DIAGNOSIS — Z12.11 COLON CANCER SCREENING: ICD-10-CM

## 2020-12-02 DIAGNOSIS — Z23 NEED FOR INFLUENZA VACCINATION: ICD-10-CM

## 2020-12-02 DIAGNOSIS — R10.11 RUQ ABDOMINAL PAIN: ICD-10-CM

## 2020-12-02 PROCEDURE — 90686 IIV4 VACC NO PRSV 0.5 ML IM: CPT | Performed by: INTERNAL MEDICINE

## 2020-12-02 PROCEDURE — 99395 PREV VISIT EST AGE 18-39: CPT | Performed by: INTERNAL MEDICINE

## 2020-12-02 PROCEDURE — 90471 IMMUNIZATION ADMIN: CPT | Performed by: INTERNAL MEDICINE

## 2020-12-02 RX ORDER — OMEPRAZOLE 40 MG/1
40 CAPSULE, DELAYED RELEASE ORAL DAILY
Qty: 90 CAPSULE | Refills: 3 | Status: SHIPPED | OUTPATIENT
Start: 2020-12-02 | End: 2021-05-20 | Stop reason: SDUPTHER

## 2020-12-02 NOTE — PROGRESS NOTES
"Subjective   Santosh Giordano is a 30 y.o. male and is here for a comprehensive physical exam. The patient reports problems - gerd.  Pt has been compliant with meds for GERD.  No sx as long as pt takes medicine as prescribed.  No epigastric pain or reflux sx      Do you take any herbs or supplements that were not prescribed by a doctor? probiotic      Social History: he does smoke 1/2 ppd    Social History     Socioeconomic History   • Marital status:      Spouse name: Lashawn Giordano   • Number of children: 0   • Years of education: Not on file   • Highest education level: Not on file   Occupational History   • Occupation:    Tobacco Use   • Smoking status: Former Smoker     Packs/day: 0.25     Quit date: 2016     Years since quittin.3   • Smokeless tobacco: Current User   Substance and Sexual Activity   • Alcohol use: Yes     Comment: social   • Drug use: No   • Sexual activity: Defer       Family History:   Family History   Problem Relation Age of Onset   • Heart failure Mother    • Kidney disease Mother    • Diabetes Mother    • Thyroid disease Father    • Heart disease Father    • Cancer Paternal Grandmother         breast       Past Medical History:   Past Medical History:   Diagnosis Date   • Headache            Review of Systems    A comprehensive review of systems was negative.    Objective   /94 (BP Location: Left arm, Patient Position: Sitting)   Pulse 98   Ht 167.6 cm (66\")   Wt 114 kg (250 lb 8 oz)   SpO2 97%   BMI 40.43 kg/m²     General Appearance:    Alert, cooperative, no distress, appears stated age   Head:    Normocephalic, without obvious abnormality, atraumatic   Eyes:    PERRL, conjunctiva/corneas clear, EOM's intact, fundi     benign, both eyes        Ears:    Normal TM's and external ear canals, both ears   Nose:   Nares normal, septum midline, mucosa normal, no drainage    or sinus tenderness   Throat:   Lips, mucosa, and tongue normal; teeth and gums normal "   Neck:   Supple, symmetrical, trachea midline, no adenopathy;        thyroid:  No enlargement/tenderness/nodules; no carotid    bruit or JVD   Back:     Symmetric, no curvature, ROM normal, no CVA tenderness   Lungs:     Clear to auscultation bilaterally, respirations unlabored   Chest wall:    No tenderness or deformity   Heart:    Regular rate and rhythm, S1 and S2 normal, no murmur, rub   or gallop   Abdomen:     Soft, non-tender, bowel sounds active all four quadrants,     no masses, no organomegaly           Extremities:   Extremities normal, atraumatic, no cyanosis or edema   Pulses:   2+ and symmetric all extremities   Skin:   Skin color, texture, turgor normal, no rashes or lesions   Lymph nodes:   Cervical, supraclavicular, and axillary nodes normal   Neurologic:   CNII-XII intact. Normal strength, sensation and reflexes       throughout       Vitals:    12/02/20 1131   BP: 128/94   Pulse: 98   SpO2: 97%     Body mass index is 40.43 kg/m².      Medications:   Current Outpatient Medications:   •  omeprazole (priLOSEC) 40 MG capsule, Take 1 capsule by mouth Daily., Disp: 30 capsule, Rfl: 5  •  Probiotic Product (TRUBIOTICS PO), Take  by mouth., Disp: , Rfl:        Assessment/Plan   Healthy male exam.      1. Healthcare Maintenance:  2. Patient Counseling:  --Nutrition: Stressed importance of moderation in sodium/caffeine intake, saturated fat and cholesterol, caloric balance, sufficient intake of fresh fruits, vegetables, fiber, calcium and vit D  --Exercise:  He does exercise occas   --Substance Abuse: he does smoke  --Dental health: he does go to the dentist  --Immunizations reviewed. rec flu shot  --Discussed benefits of screening colonoscopy. Due    3.  GERD- ok with current meds

## 2020-12-09 ENCOUNTER — TELEPHONE (OUTPATIENT)
Dept: INTERNAL MEDICINE | Facility: CLINIC | Age: 30
End: 2020-12-09

## 2020-12-09 DIAGNOSIS — N41.9 PROSTATITIS, UNSPECIFIED PROSTATITIS TYPE: Primary | ICD-10-CM

## 2020-12-09 RX ORDER — SULFAMETHOXAZOLE AND TRIMETHOPRIM 800; 160 MG/1; MG/1
1 TABLET ORAL 2 TIMES DAILY
Qty: 14 TABLET | Refills: 0 | Status: SHIPPED | OUTPATIENT
Start: 2020-12-09 | End: 2021-05-20

## 2020-12-09 NOTE — TELEPHONE ENCOUNTER
Patient called in stating he's experiencing a lot of lower stomach pain. Patient says he believes it's a bladder infection. Patient would like to get a antibiotic sent to the pharmacy.    [facility] [id number]    Best call back # [phone]

## 2021-01-27 ENCOUNTER — TRANSCRIBE ORDERS (OUTPATIENT)
Dept: LAB | Facility: SURGERY CENTER | Age: 31
End: 2021-01-27

## 2021-01-27 DIAGNOSIS — Z01.818 OTHER SPECIFIED PRE-OPERATIVE EXAMINATION: Primary | ICD-10-CM

## 2021-01-29 ENCOUNTER — LAB (OUTPATIENT)
Dept: LAB | Facility: SURGERY CENTER | Age: 31
End: 2021-01-29

## 2021-01-29 ENCOUNTER — LAB REQUISITION (OUTPATIENT)
Dept: LAB | Facility: HOSPITAL | Age: 31
End: 2021-01-29

## 2021-01-29 ENCOUNTER — APPOINTMENT (OUTPATIENT)
Dept: LAB | Facility: SURGERY CENTER | Age: 31
End: 2021-01-29

## 2021-01-29 DIAGNOSIS — Z00.00 ENCOUNTER FOR GENERAL ADULT MEDICAL EXAMINATION WITHOUT ABNORMAL FINDINGS: ICD-10-CM

## 2021-01-29 PROCEDURE — U0004 COV-19 TEST NON-CDC HGH THRU: HCPCS | Performed by: SURGERY

## 2021-01-30 ENCOUNTER — LAB (OUTPATIENT)
Dept: LAB | Facility: SURGERY CENTER | Age: 31
End: 2021-01-30

## 2021-01-30 DIAGNOSIS — Z01.818 OTHER SPECIFIED PRE-OPERATIVE EXAMINATION: ICD-10-CM

## 2021-01-30 LAB — SARS-COV-2 ORF1AB RESP QL NAA+PROBE: NOT DETECTED

## 2021-02-01 ENCOUNTER — HOSPITAL ENCOUNTER (OUTPATIENT)
Facility: SURGERY CENTER | Age: 31
Setting detail: HOSPITAL OUTPATIENT SURGERY
Discharge: HOME | End: 2021-02-01
Attending: SURGERY | Admitting: SURGERY

## 2021-02-01 ENCOUNTER — ANESTHESIA (OUTPATIENT)
Dept: SURGERY | Facility: SURGERY CENTER | Age: 31
End: 2021-02-01

## 2021-02-01 ENCOUNTER — OUTSIDE FACILITY SERVICE (OUTPATIENT)
Dept: SURGERY | Facility: CLINIC | Age: 31
End: 2021-02-01

## 2021-02-01 ENCOUNTER — ANESTHESIA EVENT (OUTPATIENT)
Dept: SURGERY | Facility: SURGERY CENTER | Age: 31
End: 2021-02-01

## 2021-02-01 VITALS
OXYGEN SATURATION: 95 % | WEIGHT: 244 LBS | DIASTOLIC BLOOD PRESSURE: 59 MMHG | HEART RATE: 70 BPM | BODY MASS INDEX: 39.21 KG/M2 | SYSTOLIC BLOOD PRESSURE: 120 MMHG | HEIGHT: 66 IN | TEMPERATURE: 98 F | RESPIRATION RATE: 16 BRPM

## 2021-02-01 PROCEDURE — 45378 DIAGNOSTIC COLONOSCOPY: CPT | Performed by: SURGERY

## 2021-02-01 PROCEDURE — OUTSIDEPOS PR OUTSIDE POS PLACEHOLDER: Performed by: SURGERY

## 2021-02-01 PROCEDURE — S0260 H&P FOR SURGERY: HCPCS | Performed by: SURGERY

## 2021-02-01 PROCEDURE — 25010000002 PROPOFOL 10 MG/ML EMULSION: Performed by: ANESTHESIOLOGY

## 2021-02-01 RX ORDER — PROPOFOL 10 MG/ML
VIAL (ML) INTRAVENOUS AS NEEDED
Status: DISCONTINUED | OUTPATIENT
Start: 2021-02-01 | End: 2021-02-01 | Stop reason: SURG

## 2021-02-01 RX ORDER — SODIUM CHLORIDE, SODIUM LACTATE, POTASSIUM CHLORIDE, CALCIUM CHLORIDE 600; 310; 30; 20 MG/100ML; MG/100ML; MG/100ML; MG/100ML
9 INJECTION, SOLUTION INTRAVENOUS CONTINUOUS
Status: DISCONTINUED | OUTPATIENT
Start: 2021-02-01 | End: 2021-02-01 | Stop reason: HOSPADM

## 2021-02-01 RX ORDER — LIDOCAINE HYDROCHLORIDE 20 MG/ML
INJECTION, SOLUTION INFILTRATION; PERINEURAL AS NEEDED
Status: DISCONTINUED | OUTPATIENT
Start: 2021-02-01 | End: 2021-02-01 | Stop reason: SURG

## 2021-02-01 RX ADMIN — PROPOFOL 50 MG: 10 INJECTION, EMULSION INTRAVENOUS at 11:32

## 2021-02-01 RX ADMIN — PROPOFOL 140 MCG/KG/MIN: 10 INJECTION, EMULSION INTRAVENOUS at 11:25

## 2021-02-01 RX ADMIN — SODIUM CHLORIDE, POTASSIUM CHLORIDE, SODIUM LACTATE AND CALCIUM CHLORIDE 1000 ML: 600; 310; 30; 20 INJECTION, SOLUTION INTRAVENOUS at 10:53

## 2021-02-01 RX ADMIN — SODIUM CHLORIDE, SODIUM LACTATE, POTASSIUM CHLORIDE, AND CALCIUM CHLORIDE: .6; .31; .03; .02 INJECTION, SOLUTION INTRAVENOUS at 11:19

## 2021-02-01 RX ADMIN — PROPOFOL 100 MG: 10 INJECTION, EMULSION INTRAVENOUS at 11:25

## 2021-02-01 RX ADMIN — LIDOCAINE HYDROCHLORIDE 60 MG: 20 INJECTION, SOLUTION INFILTRATION; PERINEURAL at 11:25

## 2021-02-01 NOTE — H&P
Date: 2021     Patient: Santosh Giordano    : 1990   [id number]     CC:  Screening Colonoscopy, with personal history of colon polyps and family history of colon cancer     History:   The patient is a 30 y.o. male of Jolly Dia MD  who presents to discuss screening colonoscopy with personal history of colon polyps and family history of colon cancer. The patient currently has no complaints.  Patient denies any history of nausea, abdominal pain, weight loss, change in bowel habits or rectal bleeding.  Patient denies melena, hematochezia or BRBPR.  No family history of ulcerative colitis, Crohn's disease or familial polyposis.    The following portions of the patient's history were reviewed and updated as appropriate: allergies, current medications, past family history, past medical history, past social history, past surgical history and problem list.    Past Medical History:   Diagnosis Date   • COVID-19     Patient states he had Covid the week before [place]  Had positive test.  States he has no symptoms.   • Headache       Past Surgical History:   Procedure Laterality Date   • COLONOSCOPY N/A    • MOUTH SURGERY       Medications:   Medications Prior to Admission   Medication Sig Dispense Refill Last Dose   • omeprazole (priLOSEC) 40 MG capsule Take 1 capsule by mouth Daily. 90 capsule 3 2021 at Unknown time   • Probiotic Product (TRUBIOTICS PO) Take  by mouth.   2021 at Unknown time   • sulfamethoxazole-trimethoprim (Bactrim DS) 800-160 MG per tablet Take 1 tablet by mouth 2 (Two) Times a Day. 14 tablet 0      Allergies: Patient has no known allergies.   Social History:  Social History     Socioeconomic History   • Marital status:      Spouse name: Lashawn Giordano   • Number of children: 0   • Years of education: Not on file   • Highest education level: Not on file   Occupational History   • Occupation:    Tobacco Use   • Smoking status: Former Smoker     Packs/day: 0.25     " Quit date: 2016     Years since quittin.5   • Smokeless tobacco: Current User   Substance and Sexual Activity   • Alcohol use: Yes     Comment: social   • Drug use: No   • Sexual activity: Defer      Family History   Problem Relation Age of Onset   • Heart failure Mother    • Kidney disease Mother    • Diabetes Mother    • Thyroid disease Father    • Heart disease Father    • Cancer Paternal Grandmother         breast        Review of Systems:   General: Patient reports good health  Eyes: No eye problems  Ears, nose, mouth and throat: No rhinitis, no hearing problems, no chronic cough  Cardiovascular/heart: Denies palpitations, syncope or chest pain  Respiratory/lung: Denies shortness of breath, hemoptysis, or dyspnea on exertion   Genital/urinary: No frequency, hematuria or dysuria  Hematological/lymphatic: Denies anemia or other problems  Musculoskeletal: No joint pain, no defects  Skin: No psoriasis or other skin issues  Neurological: No seizures or other neurological problems  Psychiatric: None  Endocrine: Negative  Gastro-intestinal: No constipation, no diarrhea, no melena, no hematochezia    /77 (BP Location: Left arm, Patient Position: Lying)   Pulse 77   Temp 97.7 °F (36.5 °C) (Temporal)   Resp 16   Ht 167.6 cm (66\")   Wt 111 kg (244 lb)   SpO2 96%   BMI 39.38 kg/m²     Physical Examination:  General: Alert and oriented x3 in no acute distress  HEENT: Normal cephalic, atraumatic, PERRLA, EOMI, sclera anicteric, moist mucous membranes, neck is supple, no JVD, no carotid bruits, no thyromegaly no adenopathy  Chest: CTA and percussion  CVA: RRR, normal S1-S2, no murmurs, no gallops or rubs  Abdomen: Positive BS, soft, nondistended, nontender, no rebound, no guarding, no hernias, no organomegaly and no masses  Extremities: Full range of motion, no clubbing, no cyanosis or edema  Neurovascular: Grossly intact  Debilities: None  Emotional Behavior: Appropriate     Impression:  30 y.o. male " for screening colonoscopy with personal history of colon polyps and family history of colon cancer.    Plan:  Patient is presenting for screening colonoscopy with personal history of colon polyps and family history of colon cancer.  I have recommended that the patient undergo a screening colonoscopy in accordance of American Cancer Society's guidelines.  I have discussed this procedure in detail with the patient.  I have discussed the risks, benefits and alternatives.  I have discussed the risk of anesthesia, bleeding and perforation.  Patient understands these risks, benefits and alternatives and wishes to proceed.      Kelli Ibarra MD  General, Minimally Invasive and Endoscopic Surgery  [facility] [place] [id number] [facility] [address] [place] [address]    [address]  [address] [address]    P: [phone]  F: [phone]    Cc:  Jolly Dia MD

## 2021-02-01 NOTE — ANESTHESIA PREPROCEDURE EVALUATION
Anesthesia Evaluation     Patient summary reviewed and Nursing notes reviewed   NPO Solid Status: > 8 hours  NPO Liquid Status: > 2 hours           Airway   Mallampati: II  TM distance: >3 FB  Neck ROM: full  No difficulty expected and Large neck circumference  Dental - normal exam     Pulmonary - normal exam   (+) a smoker Current,   Cardiovascular - negative cardio ROS and normal exam        Neuro/Psych  (+) headaches,     GI/Hepatic/Renal/Endo    (+) morbid obesity, GERD,      Musculoskeletal (-) negative ROS    Abdominal   (+) obese,    Substance History - negative use     OB/GYN          Other - negative ROS                       Anesthesia Plan    ASA 3     MAC     intravenous induction     Anesthetic plan, all risks, benefits, and alternatives have been provided, discussed and informed consent has been obtained with: patient.

## 2021-02-01 NOTE — OP NOTE
Operative Note:    Pre-op dx: Screening Colonoscopy, family history of colon cancer and personal history of polyps    Post-op dx: diverticulosis    Procedure: Colonoscopy    Surgeon: Kelli Ibarra MD    Anesthesia: MAC    EBL: none    Specimen:  * No orders in the log *    Complications: none    Procedure:    Colonoscopy:  A digital rectal examination was performed which revealed no rectal masses.  Scope was introduced into the rectum and advanced into the sigmoid, descending colon, splenic flexure, transverse colon, hepatic flexure, ascending colon and into the cecum.  Cecum was identified by the ileocecal valve and appendiceal orifice.  Patient had sigmoid and descending colon diverticulosis.  There was no evidence of any polyps, masses, AV malformation or inflammation.  The bowel preparation was excellent. The scope was slowly withdrawn and a second look was performed.  Upon the second look, there were no new findings.  The colon was desufflated and the procedure was terminated.   Patient was transferred to recovery room in stable condition.      Assessment/Plan:  Repeat colonoscopy in 5 years.  Diverticulosis -provide patient with educational pamphlet on diverticulosis    Kelli Ibarra MD  General, Minimally Invasive and Endoscopic Surgery  [facility] [place] [id number] [facility] [address] [place] [address]   [address]    [address]  [place] [address]               [place] [address]    P: [phone]  F: [phone]    Cc:  Jolly Dia MD

## 2021-02-01 NOTE — ANESTHESIA POSTPROCEDURE EVALUATION
"Patient: Santosh Giordano    Procedure Summary     Date: 02/01/21 Room / Location: [facility] OR  / [facility]    Anesthesia Start: 1119 Anesthesia Stop: 1149    Procedure: COLONOSCOPY (N/A ) Diagnosis:       Personal history of colonic polyps      (Personal history of colonic polyps [Z86.010])    Surgeon: Kelli Ibarra MD Provider:     Anesthesia Type: MAC ASA Status: 3          Anesthesia Type: MAC    Vitals  No vitals data found for the desired time range.          Post Anesthesia Care and Evaluation    Patient location during evaluation: bedside  Patient participation: complete - patient participated  Level of consciousness: awake and alert  Pain score: 0  Pain management: adequate  Airway patency: patent  Anesthetic complications: No anesthetic complications  PONV Status: none  Cardiovascular status: acceptable and hemodynamically stable  Respiratory status: acceptable and spontaneous ventilation  Hydration status: acceptable    Comments: /77 (BP Location: Left arm, Patient Position: Lying)   Pulse 77   Temp 36.5 °C (97.7 °F) (Temporal)   Resp 16   Ht 167.6 cm (66\")   Wt 111 kg (244 lb)   SpO2 96%   BMI 39.38 kg/m²         "

## 2021-02-01 NOTE — DISCHARGE INSTRUCTIONS
Repeat colonoscopy in 5 years  Diverticulosis -provide patient with educational information on diverticulosis

## 2021-05-20 ENCOUNTER — APPOINTMENT (OUTPATIENT)
Dept: CT IMAGING | Facility: HOSPITAL | Age: 31
End: 2021-05-20

## 2021-05-20 ENCOUNTER — HOSPITAL ENCOUNTER (INPATIENT)
Facility: HOSPITAL | Age: 31
LOS: 3 days | Discharge: HOME | End: 2021-05-23
Attending: EMERGENCY MEDICINE | Admitting: INTERNAL MEDICINE

## 2021-05-20 ENCOUNTER — LAB (OUTPATIENT)
Dept: LAB | Facility: HOSPITAL | Age: 31
End: 2021-05-20

## 2021-05-20 ENCOUNTER — OFFICE VISIT (OUTPATIENT)
Dept: INTERNAL MEDICINE | Facility: CLINIC | Age: 31
End: 2021-05-20

## 2021-05-20 VITALS
BODY MASS INDEX: 38.7 KG/M2 | DIASTOLIC BLOOD PRESSURE: 88 MMHG | SYSTOLIC BLOOD PRESSURE: 120 MMHG | TEMPERATURE: 98.2 F | WEIGHT: 240.8 LBS | OXYGEN SATURATION: 98 % | HEIGHT: 66 IN | HEART RATE: 120 BPM

## 2021-05-20 DIAGNOSIS — R10.13 EPIGASTRIC PAIN: Primary | ICD-10-CM

## 2021-05-20 DIAGNOSIS — K57.92 ACUTE DIVERTICULITIS: ICD-10-CM

## 2021-05-20 DIAGNOSIS — R10.13 EPIGASTRIC ABDOMINAL PAIN: ICD-10-CM

## 2021-05-20 DIAGNOSIS — R00.0 SINUS TACHYCARDIA: ICD-10-CM

## 2021-05-20 DIAGNOSIS — D50.8 OTHER IRON DEFICIENCY ANEMIA: ICD-10-CM

## 2021-05-20 DIAGNOSIS — K92.2 UPPER GI BLEED: Primary | ICD-10-CM

## 2021-05-20 LAB
ABO GROUP BLD: NORMAL
ALBUMIN SERPL-MCNC: 4.1 G/DL (ref 3.5–5.2)
ALBUMIN SERPL-MCNC: 4.2 G/DL (ref 3.5–5.2)
ALBUMIN/GLOB SERPL: 1.6 G/DL
ALBUMIN/GLOB SERPL: 2 G/DL
ALP SERPL-CCNC: 51 U/L (ref 39–117)
ALP SERPL-CCNC: 56 U/L (ref 39–117)
ALT SERPL W P-5'-P-CCNC: 38 U/L (ref 1–41)
ALT SERPL W P-5'-P-CCNC: 40 U/L (ref 1–41)
ANION GAP SERPL CALCULATED.3IONS-SCNC: 6.6 MMOL/L (ref 5–15)
ANION GAP SERPL CALCULATED.3IONS-SCNC: 7 MMOL/L (ref 5–15)
AST SERPL-CCNC: 15 U/L (ref 1–40)
AST SERPL-CCNC: 18 U/L (ref 1–40)
BASOPHILS # BLD AUTO: 0.03 10*3/MM3 (ref 0–0.2)
BASOPHILS # BLD AUTO: 0.04 10*3/MM3 (ref 0–0.2)
BASOPHILS NFR BLD AUTO: 0.2 % (ref 0–1.5)
BASOPHILS NFR BLD AUTO: 0.3 % (ref 0–1.5)
BILIRUB SERPL-MCNC: 0.6 MG/DL (ref 0–1.2)
BILIRUB SERPL-MCNC: 0.6 MG/DL (ref 0–1.2)
BLD GP AB SCN SERPL QL: NEGATIVE
BUN SERPL-MCNC: 27 MG/DL (ref 6–20)
BUN SERPL-MCNC: 29 MG/DL (ref 6–20)
BUN/CREAT SERPL: 37.2 (ref 7–25)
BUN/CREAT SERPL: 42.2 (ref 7–25)
CALCIUM SPEC-SCNC: 8.8 MG/DL (ref 8.6–10.5)
CALCIUM SPEC-SCNC: 9.3 MG/DL (ref 8.6–10.5)
CHLORIDE SERPL-SCNC: 106 MMOL/L (ref 98–107)
CHLORIDE SERPL-SCNC: 108 MMOL/L (ref 98–107)
CO2 SERPL-SCNC: 21.4 MMOL/L (ref 22–29)
CO2 SERPL-SCNC: 22 MMOL/L (ref 22–29)
CREAT SERPL-MCNC: 0.64 MG/DL (ref 0.76–1.27)
CREAT SERPL-MCNC: 0.78 MG/DL (ref 0.76–1.27)
D-LACTATE SERPL-SCNC: 0.8 MMOL/L (ref 0.5–2)
DEPRECATED RDW RBC AUTO: 41 FL (ref 37–54)
DEPRECATED RDW RBC AUTO: 43.2 FL (ref 37–54)
EOSINOPHIL # BLD AUTO: 0.43 10*3/MM3 (ref 0–0.4)
EOSINOPHIL # BLD AUTO: 0.71 10*3/MM3 (ref 0–0.4)
EOSINOPHIL NFR BLD AUTO: 2.3 % (ref 0.3–6.2)
EOSINOPHIL NFR BLD AUTO: 5.1 % (ref 0.3–6.2)
ERYTHROCYTE [DISTWIDTH] IN BLOOD BY AUTOMATED COUNT: 12.7 % (ref 12.3–15.4)
ERYTHROCYTE [DISTWIDTH] IN BLOOD BY AUTOMATED COUNT: 12.7 % (ref 12.3–15.4)
GFR SERPL CREATININE-BSD FRML MDRD: 118 ML/MIN/1.73
GFR SERPL CREATININE-BSD FRML MDRD: 148 ML/MIN/1.73
GLOBULIN UR ELPH-MCNC: 2.1 GM/DL
GLOBULIN UR ELPH-MCNC: 2.6 GM/DL
GLUCOSE SERPL-MCNC: 106 MG/DL (ref 65–99)
GLUCOSE SERPL-MCNC: 117 MG/DL (ref 65–99)
HCT VFR BLD AUTO: 36.9 % (ref 37.5–51)
HCT VFR BLD AUTO: 37.7 % (ref 37.5–51)
HGB BLD-MCNC: 13.2 G/DL (ref 13–17.7)
HGB BLD-MCNC: 13.8 G/DL (ref 13–17.7)
IMM GRANULOCYTES # BLD AUTO: 0.04 10*3/MM3 (ref 0–0.05)
IMM GRANULOCYTES # BLD AUTO: 0.08 10*3/MM3 (ref 0–0.05)
IMM GRANULOCYTES NFR BLD AUTO: 0.3 % (ref 0–0.5)
IMM GRANULOCYTES NFR BLD AUTO: 0.4 % (ref 0–0.5)
LIPASE SERPL-CCNC: 29 U/L (ref 13–60)
LYMPHOCYTES # BLD AUTO: 2.46 10*3/MM3 (ref 0.7–3.1)
LYMPHOCYTES # BLD AUTO: 3.07 10*3/MM3 (ref 0.7–3.1)
LYMPHOCYTES NFR BLD AUTO: 13.4 % (ref 19.6–45.3)
LYMPHOCYTES NFR BLD AUTO: 22.1 % (ref 19.6–45.3)
MCH RBC QN AUTO: 32.5 PG (ref 26.6–33)
MCH RBC QN AUTO: 33.2 PG (ref 26.6–33)
MCHC RBC AUTO-ENTMCNC: 35.8 G/DL (ref 31.5–35.7)
MCHC RBC AUTO-ENTMCNC: 36.6 G/DL (ref 31.5–35.7)
MCV RBC AUTO: 88.7 FL (ref 79–97)
MCV RBC AUTO: 92.9 FL (ref 79–97)
MONOCYTES # BLD AUTO: 0.84 10*3/MM3 (ref 0.1–0.9)
MONOCYTES # BLD AUTO: 0.97 10*3/MM3 (ref 0.1–0.9)
MONOCYTES NFR BLD AUTO: 5.3 % (ref 5–12)
MONOCYTES NFR BLD AUTO: 6 % (ref 5–12)
NEUTROPHILS NFR BLD AUTO: 14.33 10*3/MM3 (ref 1.7–7)
NEUTROPHILS NFR BLD AUTO: 66.2 % (ref 42.7–76)
NEUTROPHILS NFR BLD AUTO: 78.4 % (ref 42.7–76)
NEUTROPHILS NFR BLD AUTO: 9.2 10*3/MM3 (ref 1.7–7)
NRBC BLD AUTO-RTO: 0 /100 WBC (ref 0–0.2)
NRBC BLD AUTO-RTO: 0 /100 WBC (ref 0–0.2)
PLATELET # BLD AUTO: 277 10*3/MM3 (ref 140–450)
PLATELET # BLD AUTO: 303 10*3/MM3 (ref 140–450)
PMV BLD AUTO: 10.1 FL (ref 6–12)
PMV BLD AUTO: 9.8 FL (ref 6–12)
POTASSIUM SERPL-SCNC: 4.2 MMOL/L (ref 3.5–5.2)
POTASSIUM SERPL-SCNC: 4.4 MMOL/L (ref 3.5–5.2)
PROT SERPL-MCNC: 6.2 G/DL (ref 6–8.5)
PROT SERPL-MCNC: 6.8 G/DL (ref 6–8.5)
RBC # BLD AUTO: 3.97 10*6/MM3 (ref 4.14–5.8)
RBC # BLD AUTO: 4.25 10*6/MM3 (ref 4.14–5.8)
RH BLD: NEGATIVE
SODIUM SERPL-SCNC: 134 MMOL/L (ref 136–145)
SODIUM SERPL-SCNC: 137 MMOL/L (ref 136–145)
T&S EXPIRATION DATE: NORMAL
WBC # BLD AUTO: 13.9 10*3/MM3 (ref 3.4–10.8)
WBC # BLD AUTO: 18.3 10*3/MM3 (ref 3.4–10.8)

## 2021-05-20 PROCEDURE — 83605 ASSAY OF LACTIC ACID: CPT | Performed by: EMERGENCY MEDICINE

## 2021-05-20 PROCEDURE — U0004 COV-19 TEST NON-CDC HGH THRU: HCPCS | Performed by: EMERGENCY MEDICINE

## 2021-05-20 PROCEDURE — 36415 COLL VENOUS BLD VENIPUNCTURE: CPT | Performed by: INTERNAL MEDICINE

## 2021-05-20 PROCEDURE — 25010000002 IOPAMIDOL 61 % SOLUTION: Performed by: EMERGENCY MEDICINE

## 2021-05-20 PROCEDURE — 83690 ASSAY OF LIPASE: CPT | Performed by: EMERGENCY MEDICINE

## 2021-05-20 PROCEDURE — 99284 EMERGENCY DEPT VISIT MOD MDM: CPT

## 2021-05-20 PROCEDURE — 99214 OFFICE O/P EST MOD 30 MIN: CPT | Performed by: INTERNAL MEDICINE

## 2021-05-20 PROCEDURE — 93000 ELECTROCARDIOGRAM COMPLETE: CPT | Performed by: INTERNAL MEDICINE

## 2021-05-20 PROCEDURE — 87040 BLOOD CULTURE FOR BACTERIA: CPT | Performed by: EMERGENCY MEDICINE

## 2021-05-20 PROCEDURE — 86900 BLOOD TYPING SEROLOGIC ABO: CPT | Performed by: EMERGENCY MEDICINE

## 2021-05-20 PROCEDURE — 80053 COMPREHEN METABOLIC PANEL: CPT | Performed by: EMERGENCY MEDICINE

## 2021-05-20 PROCEDURE — 86850 RBC ANTIBODY SCREEN: CPT | Performed by: EMERGENCY MEDICINE

## 2021-05-20 PROCEDURE — 80053 COMPREHEN METABOLIC PANEL: CPT | Performed by: INTERNAL MEDICINE

## 2021-05-20 PROCEDURE — 85025 COMPLETE CBC W/AUTO DIFF WBC: CPT | Performed by: EMERGENCY MEDICINE

## 2021-05-20 PROCEDURE — 25010000002 PIPERACILLIN SOD-TAZOBACTAM PER 1 G: Performed by: EMERGENCY MEDICINE

## 2021-05-20 PROCEDURE — 86901 BLOOD TYPING SEROLOGIC RH(D): CPT | Performed by: EMERGENCY MEDICINE

## 2021-05-20 PROCEDURE — 74177 CT ABD & PELVIS W/CONTRAST: CPT

## 2021-05-20 PROCEDURE — 85025 COMPLETE CBC W/AUTO DIFF WBC: CPT | Performed by: INTERNAL MEDICINE

## 2021-05-20 RX ORDER — ACETAMINOPHEN 650 MG/1
650 SUPPOSITORY RECTAL EVERY 4 HOURS PRN
Status: DISCONTINUED | OUTPATIENT
Start: 2021-05-20 | End: 2021-05-23 | Stop reason: HOSPADM

## 2021-05-20 RX ORDER — SODIUM CHLORIDE 0.9 % (FLUSH) 0.9 %
10 SYRINGE (ML) INJECTION AS NEEDED
Status: DISCONTINUED | OUTPATIENT
Start: 2021-05-20 | End: 2021-05-23 | Stop reason: HOSPADM

## 2021-05-20 RX ORDER — SODIUM CHLORIDE 9 MG/ML
100 INJECTION, SOLUTION INTRAVENOUS CONTINUOUS
Status: DISCONTINUED | OUTPATIENT
Start: 2021-05-20 | End: 2021-05-22

## 2021-05-20 RX ORDER — ONDANSETRON 2 MG/ML
4 INJECTION INTRAMUSCULAR; INTRAVENOUS EVERY 6 HOURS PRN
Status: DISCONTINUED | OUTPATIENT
Start: 2021-05-20 | End: 2021-05-23 | Stop reason: HOSPADM

## 2021-05-20 RX ORDER — SODIUM CHLORIDE 0.9 % (FLUSH) 0.9 %
10 SYRINGE (ML) INJECTION EVERY 12 HOURS SCHEDULED
Status: DISCONTINUED | OUTPATIENT
Start: 2021-05-20 | End: 2021-05-23 | Stop reason: HOSPADM

## 2021-05-20 RX ORDER — NITROGLYCERIN 0.4 MG/1
0.4 TABLET SUBLINGUAL
Status: DISCONTINUED | OUTPATIENT
Start: 2021-05-20 | End: 2021-05-23 | Stop reason: HOSPADM

## 2021-05-20 RX ORDER — OMEPRAZOLE 40 MG/1
40 CAPSULE, DELAYED RELEASE ORAL DAILY
Qty: 90 CAPSULE | Refills: 3 | Status: ON HOLD | OUTPATIENT
Start: 2021-05-20 | End: 2021-05-23 | Stop reason: SDUPTHER

## 2021-05-20 RX ORDER — ACETAMINOPHEN 160 MG/5ML
650 SOLUTION ORAL EVERY 4 HOURS PRN
Status: DISCONTINUED | OUTPATIENT
Start: 2021-05-20 | End: 2021-05-23 | Stop reason: HOSPADM

## 2021-05-20 RX ORDER — PANTOPRAZOLE SODIUM 40 MG/10ML
80 INJECTION, POWDER, LYOPHILIZED, FOR SOLUTION INTRAVENOUS ONCE
Status: COMPLETED | OUTPATIENT
Start: 2021-05-20 | End: 2021-05-20

## 2021-05-20 RX ORDER — ACETAMINOPHEN 325 MG/1
650 TABLET ORAL EVERY 4 HOURS PRN
Status: DISCONTINUED | OUTPATIENT
Start: 2021-05-20 | End: 2021-05-23 | Stop reason: HOSPADM

## 2021-05-20 RX ADMIN — IOPAMIDOL 85 ML: 612 INJECTION, SOLUTION INTRAVENOUS at 17:41

## 2021-05-20 RX ADMIN — SODIUM CHLORIDE 1000 ML: 9 INJECTION, SOLUTION INTRAVENOUS at 16:34

## 2021-05-20 RX ADMIN — SODIUM CHLORIDE 100 ML/HR: 9 INJECTION, SOLUTION INTRAVENOUS at 22:08

## 2021-05-20 RX ADMIN — PANTOPRAZOLE SODIUM 80 MG: 40 INJECTION, POWDER, FOR SOLUTION INTRAVENOUS at 17:23

## 2021-05-20 RX ADMIN — SODIUM CHLORIDE 8 MG/HR: 900 INJECTION INTRAVENOUS at 17:25

## 2021-05-20 RX ADMIN — TAZOBACTAM SODIUM AND PIPERACILLIN SODIUM 3.38 G: 375; 3 INJECTION, SOLUTION INTRAVENOUS at 20:15

## 2021-05-20 RX ADMIN — SODIUM CHLORIDE, PRESERVATIVE FREE 10 ML: 5 INJECTION INTRAVENOUS at 23:28

## 2021-05-20 RX ADMIN — SODIUM CHLORIDE 8 MG/HR: 900 INJECTION INTRAVENOUS at 23:27

## 2021-05-20 NOTE — PROGRESS NOTES
Subjective   Santosh Giordano is a 30 y.o. male here today for mid abdominal pain, nausea, black stools, hot flashes x 2 weeks    History of Present Illness   He has been having ep[igastric discomfort for a couple weeks with some black tarry schools  He has stopped the omeprazole as he ran out  He is under a lot of stress at work as well.    Patient denies any vomiting.  He denies any bright red blood in his stool.  He is able to eat and drink okay but says he has a little bit of nausea.  The following portions of the patient's history were reviewed and updated as appropriate: allergies, current medications, past medical history, past social history and problem list.  No previous ulcer    Review of Systems   All other systems reviewed and are negative.      Objective   Physical Exam  Vitals reviewed.   Constitutional:       Appearance: He is well-developed.   HENT:      Head: Normocephalic and atraumatic.      Right Ear: External ear normal.      Left Ear: External ear normal.   Eyes:      Conjunctiva/sclera: Conjunctivae normal.      Pupils: Pupils are equal, round, and reactive to light.   Neck:      Thyroid: No thyromegaly.      Trachea: No tracheal deviation.   Cardiovascular:      Rate and Rhythm: Normal rate and regular rhythm.      Heart sounds: Normal heart sounds.   Pulmonary:      Effort: Pulmonary effort is normal.      Breath sounds: Normal breath sounds.   Abdominal:      General: Bowel sounds are normal. There is no distension.      Palpations: Abdomen is soft.      Tenderness: There is no abdominal tenderness.   Musculoskeletal:         General: No deformity. Normal range of motion.      Cervical back: Normal range of motion.   Skin:     General: Skin is warm and dry.   Neurological:      Mental Status: He is alert and oriented to person, place, and time.   Psychiatric:         Behavior: Behavior normal.         Thought Content: Thought content normal.         Judgment: Judgment normal.         Vitals:     05/20/21 1332   BP: 120/88   Pulse: 120   Temp: 98.2 °F (36.8 °C)   SpO2: 98%     Body mass index is 38.87 kg/m².       Current Outpatient Medications:   •  omeprazole (priLOSEC) 40 MG capsule, Take 1 capsule by mouth Daily., Disp: 90 capsule, Rfl: 3  •  Probiotic Product (TRUBIOTICS PO), Take  by mouth., Disp: , Rfl:       Assessment/Plan   Diagnoses and all orders for this visit:    1. Epigastric pain (Primary)  -     CBC & Differential  -     Comprehensive Metabolic Panel    2. Epigastric abdominal pain  -     omeprazole (priLOSEC) 40 MG capsule; Take 1 capsule by mouth Daily.  Dispense: 90 capsule; Refill: 3    3. Sinus tachycardia  -     ECG 12 Lead      1.  Epigastric pain with black stools and tachy cardia.  Initially his heart rate was 140 after he was seated for little bit it was 120- stat labs now  Given pepcid ac and prescription for the omeprazole    Addendum: Patient's labs returned he looks a little bit dehydrated with an elevated white blood cell count with a left shift.  No anemia.  Patient says he is feeling worse and having more epigastric pain and getting more diaphoretic.  He says he is so weak he does not even know that he can drive himself but he is going to have his wife take him to the ER for further evaluation.

## 2021-05-21 ENCOUNTER — ANESTHESIA EVENT (OUTPATIENT)
Dept: GASTROENTEROLOGY | Facility: HOSPITAL | Age: 31
End: 2021-05-21

## 2021-05-21 ENCOUNTER — ANESTHESIA (OUTPATIENT)
Dept: GASTROENTEROLOGY | Facility: HOSPITAL | Age: 31
End: 2021-05-21

## 2021-05-21 LAB
ANION GAP SERPL CALCULATED.3IONS-SCNC: 5.3 MMOL/L (ref 5–15)
BUN SERPL-MCNC: 15 MG/DL (ref 6–20)
BUN/CREAT SERPL: 23.8 (ref 7–25)
CALCIUM SPEC-SCNC: 8.2 MG/DL (ref 8.6–10.5)
CHLORIDE SERPL-SCNC: 110 MMOL/L (ref 98–107)
CO2 SERPL-SCNC: 23.7 MMOL/L (ref 22–29)
CREAT SERPL-MCNC: 0.63 MG/DL (ref 0.76–1.27)
DEPRECATED RDW RBC AUTO: 43.5 FL (ref 37–54)
ERYTHROCYTE [DISTWIDTH] IN BLOOD BY AUTOMATED COUNT: 12.8 % (ref 12.3–15.4)
GFR SERPL CREATININE-BSD FRML MDRD: >150 ML/MIN/1.73
GLUCOSE SERPL-MCNC: 82 MG/DL (ref 65–99)
HCT VFR BLD AUTO: 25.6 % (ref 37.5–51)
HCT VFR BLD AUTO: 29.1 % (ref 37.5–51)
HGB BLD-MCNC: 10 G/DL (ref 13–17.7)
HGB BLD-MCNC: 9.3 G/DL (ref 13–17.7)
MCH RBC QN AUTO: 31.9 PG (ref 26.6–33)
MCHC RBC AUTO-ENTMCNC: 34.4 G/DL (ref 31.5–35.7)
MCV RBC AUTO: 93 FL (ref 79–97)
PLATELET # BLD AUTO: 228 10*3/MM3 (ref 140–450)
PMV BLD AUTO: 10 FL (ref 6–12)
POTASSIUM SERPL-SCNC: 4 MMOL/L (ref 3.5–5.2)
RBC # BLD AUTO: 3.13 10*6/MM3 (ref 4.14–5.8)
SARS-COV-2 ORF1AB RESP QL NAA+PROBE: NOT DETECTED
SODIUM SERPL-SCNC: 139 MMOL/L (ref 136–145)
WBC # BLD AUTO: 10.11 10*3/MM3 (ref 3.4–10.8)

## 2021-05-21 PROCEDURE — 85018 HEMOGLOBIN: CPT | Performed by: INTERNAL MEDICINE

## 2021-05-21 PROCEDURE — 80048 BASIC METABOLIC PNL TOTAL CA: CPT | Performed by: NURSE PRACTITIONER

## 2021-05-21 PROCEDURE — 25010000002 PIPERACILLIN SOD-TAZOBACTAM PER 1 G: Performed by: INTERNAL MEDICINE

## 2021-05-21 PROCEDURE — 85027 COMPLETE CBC AUTOMATED: CPT | Performed by: NURSE PRACTITIONER

## 2021-05-21 PROCEDURE — 43255 EGD CONTROL BLEEDING ANY: CPT | Performed by: INTERNAL MEDICINE

## 2021-05-21 PROCEDURE — 3E0G8GC INTRODUCTION OF OTHER THERAPEUTIC SUBSTANCE INTO UPPER GI, VIA NATURAL OR ARTIFICIAL OPENING ENDOSCOPIC: ICD-10-PCS | Performed by: INTERNAL MEDICINE

## 2021-05-21 PROCEDURE — C1889 IMPLANT/INSERT DEVICE, NOC: HCPCS | Performed by: INTERNAL MEDICINE

## 2021-05-21 PROCEDURE — 43239 EGD BIOPSY SINGLE/MULTIPLE: CPT | Performed by: INTERNAL MEDICINE

## 2021-05-21 PROCEDURE — 0DB78ZX EXCISION OF STOMACH, PYLORUS, VIA NATURAL OR ARTIFICIAL OPENING ENDOSCOPIC, DIAGNOSTIC: ICD-10-PCS | Performed by: INTERNAL MEDICINE

## 2021-05-21 PROCEDURE — 25010000002 EPINEPHRINE PER 0.1 MG: Performed by: INTERNAL MEDICINE

## 2021-05-21 PROCEDURE — 88342 IMHCHEM/IMCYTCHM 1ST ANTB: CPT | Performed by: INTERNAL MEDICINE

## 2021-05-21 PROCEDURE — 36415 COLL VENOUS BLD VENIPUNCTURE: CPT | Performed by: INTERNAL MEDICINE

## 2021-05-21 PROCEDURE — 25010000002 PROPOFOL 10 MG/ML EMULSION: Performed by: ANESTHESIOLOGY

## 2021-05-21 PROCEDURE — 85014 HEMATOCRIT: CPT | Performed by: INTERNAL MEDICINE

## 2021-05-21 PROCEDURE — 99254 IP/OBS CNSLTJ NEW/EST MOD 60: CPT | Performed by: INTERNAL MEDICINE

## 2021-05-21 PROCEDURE — 88305 TISSUE EXAM BY PATHOLOGIST: CPT | Performed by: INTERNAL MEDICINE

## 2021-05-21 PROCEDURE — 0W3P8ZZ CONTROL BLEEDING IN GASTROINTESTINAL TRACT, VIA NATURAL OR ARTIFICIAL OPENING ENDOSCOPIC: ICD-10-PCS | Performed by: INTERNAL MEDICINE

## 2021-05-21 DEVICE — DEV CLIP ENDO RESOLUTION360 CONTRL ROT 235CM: Type: IMPLANTABLE DEVICE | Site: DUODENUM | Status: FUNCTIONAL

## 2021-05-21 RX ORDER — PROPOFOL 10 MG/ML
VIAL (ML) INTRAVENOUS CONTINUOUS PRN
Status: DISCONTINUED | OUTPATIENT
Start: 2021-05-21 | End: 2021-05-21 | Stop reason: SURG

## 2021-05-21 RX ORDER — IBUPROFEN 200 MG
600 TABLET ORAL EVERY 6 HOURS PRN
COMMUNITY
End: 2021-05-23 | Stop reason: HOSPADM

## 2021-05-21 RX ORDER — SODIUM CHLORIDE 9 MG/ML
30 INJECTION, SOLUTION INTRAVENOUS CONTINUOUS PRN
Status: DISCONTINUED | OUTPATIENT
Start: 2021-05-21 | End: 2021-05-23 | Stop reason: HOSPADM

## 2021-05-21 RX ORDER — LIDOCAINE HYDROCHLORIDE 20 MG/ML
INJECTION, SOLUTION INFILTRATION; PERINEURAL AS NEEDED
Status: DISCONTINUED | OUTPATIENT
Start: 2021-05-21 | End: 2021-05-21 | Stop reason: SURG

## 2021-05-21 RX ORDER — EPINEPHRINE 1 MG/ML
INJECTION, SOLUTION, CONCENTRATE INTRAVENOUS AS NEEDED
Status: DISCONTINUED | OUTPATIENT
Start: 2021-05-21 | End: 2021-05-21 | Stop reason: HOSPADM

## 2021-05-21 RX ORDER — PROPOFOL 10 MG/ML
VIAL (ML) INTRAVENOUS AS NEEDED
Status: DISCONTINUED | OUTPATIENT
Start: 2021-05-21 | End: 2021-05-21 | Stop reason: SURG

## 2021-05-21 RX ADMIN — TAZOBACTAM SODIUM AND PIPERACILLIN SODIUM 3.38 G: 375; 3 INJECTION, SOLUTION INTRAVENOUS at 11:15

## 2021-05-21 RX ADMIN — SODIUM CHLORIDE 8 MG/HR: 900 INJECTION INTRAVENOUS at 08:43

## 2021-05-21 RX ADMIN — LIDOCAINE HYDROCHLORIDE 60 MG: 20 INJECTION, SOLUTION INFILTRATION; PERINEURAL at 14:32

## 2021-05-21 RX ADMIN — ACETAMINOPHEN 650 MG: 325 TABLET, FILM COATED ORAL at 23:50

## 2021-05-21 RX ADMIN — PROPOFOL 140 MG: 10 INJECTION, EMULSION INTRAVENOUS at 14:32

## 2021-05-21 RX ADMIN — SODIUM CHLORIDE 8 MG/HR: 900 INJECTION INTRAVENOUS at 17:44

## 2021-05-21 RX ADMIN — SODIUM CHLORIDE, PRESERVATIVE FREE 10 ML: 5 INJECTION INTRAVENOUS at 08:32

## 2021-05-21 RX ADMIN — SODIUM CHLORIDE 100 ML/HR: 9 INJECTION, SOLUTION INTRAVENOUS at 08:43

## 2021-05-21 RX ADMIN — SODIUM CHLORIDE 8 MG/HR: 900 INJECTION INTRAVENOUS at 04:29

## 2021-05-21 RX ADMIN — SODIUM CHLORIDE 30 ML/HR: 9 INJECTION, SOLUTION INTRAVENOUS at 13:43

## 2021-05-21 RX ADMIN — TAZOBACTAM SODIUM AND PIPERACILLIN SODIUM 3.38 G: 375; 3 INJECTION, SOLUTION INTRAVENOUS at 04:35

## 2021-05-21 RX ADMIN — PROPOFOL 160 MCG/KG/MIN: 10 INJECTION, EMULSION INTRAVENOUS at 14:32

## 2021-05-21 RX ADMIN — SODIUM CHLORIDE, PRESERVATIVE FREE 10 ML: 5 INJECTION INTRAVENOUS at 20:15

## 2021-05-21 RX ADMIN — TAZOBACTAM SODIUM AND PIPERACILLIN SODIUM 3.38 G: 375; 3 INJECTION, SOLUTION INTRAVENOUS at 20:15

## 2021-05-21 NOTE — ANESTHESIA POSTPROCEDURE EVALUATION
"Patient: Santosh Giordano    Procedure Summary     Date: 05/21/21 Room / Location:  [facility] /  [facility]    Anesthesia Start: 1426 Anesthesia Stop: 1507    Procedure: ESOPHAGOGASTRODUODENOSCOPY with biopsy and cauterization of antrum ulcer with resolution clips x2 and epinephrine, (N/A Esophagus) Diagnosis:       Upper GI bleed      (Upper GI bleed [K92.2])    Surgeons: Lashawn Handley MD Provider: Nolberto Silver MD    Anesthesia Type: MAC ASA Status: 3          Anesthesia Type: MAC    Vitals  Vitals Value Taken Time   /75 05/21/21 1525   Temp     Pulse 79 05/21/21 1525   Resp 16 05/21/21 1525   SpO2 98 % 05/21/21 1525           Post Anesthesia Care and Evaluation    Level of consciousness: awake and alert  Pain management: adequate  Anesthetic complications: No anesthetic complications    Cardiovascular status: acceptable  Respiratory status: acceptable  Hydration status: acceptable    Comments: /75 (BP Location: Left arm, Patient Position: Lying)   Pulse 79   Temp 36.7 °C (98 °F) (Oral)   Resp 16   Ht 167.6 cm (66\")   Wt 110 kg (242 lb 12.8 oz)   SpO2 98%   BMI 39.19 kg/m²         "

## 2021-05-22 ENCOUNTER — INPATIENT HOSPITAL (OUTPATIENT)
Dept: URBAN - METROPOLITAN AREA HOSPITAL 113 | Facility: HOSPITAL | Age: 31
End: 2021-05-22
Payer: MEDICAID

## 2021-05-22 DIAGNOSIS — K21.9 GASTRO-ESOPHAGEAL REFLUX DISEASE WITHOUT ESOPHAGITIS: ICD-10-CM

## 2021-05-22 DIAGNOSIS — K57.92 DIVERTICULITIS OF INTESTINE, PART UNSPECIFIED, WITHOUT PERFO: ICD-10-CM

## 2021-05-22 DIAGNOSIS — K92.89 OTHER SPECIFIED DISEASES OF THE DIGESTIVE SYSTEM: ICD-10-CM

## 2021-05-22 DIAGNOSIS — E66.9 OBESITY, UNSPECIFIED: ICD-10-CM

## 2021-05-22 LAB
ANION GAP SERPL CALCULATED.3IONS-SCNC: 5 MMOL/L (ref 5–15)
BUN SERPL-MCNC: 6 MG/DL (ref 6–20)
BUN/CREAT SERPL: 7.4 (ref 7–25)
CALCIUM SPEC-SCNC: 8.1 MG/DL (ref 8.6–10.5)
CHLORIDE SERPL-SCNC: 112 MMOL/L (ref 98–107)
CO2 SERPL-SCNC: 24 MMOL/L (ref 22–29)
CREAT SERPL-MCNC: 0.81 MG/DL (ref 0.76–1.27)
DEPRECATED RDW RBC AUTO: 43.6 FL (ref 37–54)
ERYTHROCYTE [DISTWIDTH] IN BLOOD BY AUTOMATED COUNT: 12.7 % (ref 12.3–15.4)
GFR SERPL CREATININE-BSD FRML MDRD: 113 ML/MIN/1.73
GLUCOSE SERPL-MCNC: 100 MG/DL (ref 65–99)
HCT VFR BLD AUTO: 24.4 % (ref 37.5–51)
HCT VFR BLD AUTO: 24.4 % (ref 37.5–51)
HCT VFR BLD AUTO: 26 % (ref 37.5–51)
HCT VFR BLD AUTO: 27.7 % (ref 37.5–51)
HGB BLD-MCNC: 8.6 G/DL (ref 13–17.7)
HGB BLD-MCNC: 8.6 G/DL (ref 13–17.7)
HGB BLD-MCNC: 8.9 G/DL (ref 13–17.7)
HGB BLD-MCNC: 9.7 G/DL (ref 13–17.7)
MCH RBC QN AUTO: 33.5 PG (ref 26.6–33)
MCHC RBC AUTO-ENTMCNC: 35.2 G/DL (ref 31.5–35.7)
MCV RBC AUTO: 94.9 FL (ref 79–97)
PLATELET # BLD AUTO: 192 10*3/MM3 (ref 140–450)
PMV BLD AUTO: 10.1 FL (ref 6–12)
POTASSIUM SERPL-SCNC: 3.7 MMOL/L (ref 3.5–5.2)
RBC # BLD AUTO: 2.57 10*6/MM3 (ref 4.14–5.8)
SODIUM SERPL-SCNC: 141 MMOL/L (ref 136–145)
WBC # BLD AUTO: 7.14 10*3/MM3 (ref 3.4–10.8)

## 2021-05-22 PROCEDURE — 80048 BASIC METABOLIC PNL TOTAL CA: CPT | Performed by: INTERNAL MEDICINE

## 2021-05-22 PROCEDURE — 99232 SBSQ HOSP IP/OBS MODERATE 35: CPT | Performed by: INTERNAL MEDICINE

## 2021-05-22 PROCEDURE — 85027 COMPLETE CBC AUTOMATED: CPT | Performed by: INTERNAL MEDICINE

## 2021-05-22 PROCEDURE — 85014 HEMATOCRIT: CPT | Performed by: INTERNAL MEDICINE

## 2021-05-22 PROCEDURE — 36415 COLL VENOUS BLD VENIPUNCTURE: CPT | Performed by: INTERNAL MEDICINE

## 2021-05-22 PROCEDURE — 85018 HEMOGLOBIN: CPT | Performed by: INTERNAL MEDICINE

## 2021-05-22 PROCEDURE — 25010000002 PIPERACILLIN SOD-TAZOBACTAM PER 1 G: Performed by: INTERNAL MEDICINE

## 2021-05-22 RX ORDER — PANTOPRAZOLE SODIUM 40 MG/1
40 TABLET, DELAYED RELEASE ORAL
Status: DISCONTINUED | OUTPATIENT
Start: 2021-05-22 | End: 2021-05-22

## 2021-05-22 RX ADMIN — SODIUM CHLORIDE 8 MG/HR: 900 INJECTION INTRAVENOUS at 05:47

## 2021-05-22 RX ADMIN — SODIUM CHLORIDE 8 MG/HR: 900 INJECTION INTRAVENOUS at 18:12

## 2021-05-22 RX ADMIN — SODIUM CHLORIDE 100 ML/HR: 9 INJECTION, SOLUTION INTRAVENOUS at 00:32

## 2021-05-22 RX ADMIN — TAZOBACTAM SODIUM AND PIPERACILLIN SODIUM 3.38 G: 375; 3 INJECTION, SOLUTION INTRAVENOUS at 04:30

## 2021-05-22 RX ADMIN — SODIUM CHLORIDE, PRESERVATIVE FREE 10 ML: 5 INJECTION INTRAVENOUS at 21:49

## 2021-05-22 RX ADMIN — ACETAMINOPHEN 650 MG: 325 TABLET, FILM COATED ORAL at 15:19

## 2021-05-22 RX ADMIN — TAZOBACTAM SODIUM AND PIPERACILLIN SODIUM 3.38 G: 375; 3 INJECTION, SOLUTION INTRAVENOUS at 11:11

## 2021-05-22 RX ADMIN — SODIUM CHLORIDE 100 ML/HR: 9 INJECTION, SOLUTION INTRAVENOUS at 09:41

## 2021-05-22 RX ADMIN — SODIUM CHLORIDE 8 MG/HR: 900 INJECTION INTRAVENOUS at 12:35

## 2021-05-22 RX ADMIN — SODIUM CHLORIDE 8 MG/HR: 900 INJECTION INTRAVENOUS at 01:18

## 2021-05-22 RX ADMIN — TAZOBACTAM SODIUM AND PIPERACILLIN SODIUM 3.38 G: 375; 3 INJECTION, SOLUTION INTRAVENOUS at 18:10

## 2021-05-22 RX ADMIN — ACETAMINOPHEN 650 MG: 325 TABLET, FILM COATED ORAL at 11:11

## 2021-05-22 NOTE — PROGRESS NOTES
[facility]     Progress Note    Patient Name: Santosh Giordano  : 1990  MRN: [id number]  Primary Care Physician:  Jolly Dia MD  Date of admission: 2021    Subjective   Subjective     Chief Complaint: duodenal ulcer     History of Present Illness  Patient Reports black stools are less frequent. Feels better.     Review of Systems   Gastrointestinal: Positive for blood in stool.   Neurological: Positive for weakness.       Objective   Objective     Vitals:   Temp:  [97.7 °F (36.5 °C)-98 °F (36.7 °C)] 98 °F (36.7 °C)  Heart Rate:  [77-86] 80  Resp:  [16-20] 16  BP: (109-117)/(54-75) 112/66    Physical Exam  HENT:      Right Ear: External ear normal.      Left Ear: External ear normal.      Mouth/Throat:      Pharynx: Oropharynx is clear.   Eyes:      Conjunctiva/sclera: Conjunctivae normal.   Cardiovascular:      Rate and Rhythm: Normal rate.      Pulses: Normal pulses.   Pulmonary:      Effort: Pulmonary effort is normal.   Abdominal:      General: Abdomen is flat.   Skin:     General: Skin is warm.   Neurological:      General: No focal deficit present.      Mental Status: He is alert.   Psychiatric:         Mood and Affect: Mood normal.          Result Review    Result Review:  I have personally reviewed the results from the time of this admission to 2021 12:47 EDT and agree with these findings:  []  Laboratory  []  Microbiology  []  Radiology  []  EKG/Telemetry   []  Cardiology/Vascular   []  Pathology  []  Old records  []  Other:      Assessment/Plan   Assessment / Plan     Brief Patient Summary:  Santosh Giordano is a 30 y.o. male   Duodenal ulcer with visible vessel s/p endoscopic treatment     Active Hospital Problems:  Active Hospital Problems    Diagnosis    • **Upper GI bleed    • Diverticulitis    • Obesity (BMI 30-39.9)    • Acid reflux      Plan: given the fact required endoscopic treatment, inclined wait another day and observe , possible discharge tomorrow or Monday to be safe     DVT  prophylaxis:  Mechanical DVT prophylaxis orders are present.    CODE STATUS:    Code Status: CPR  Medical Interventions (Level of Support Prior to Arrest): Full      Electronically signed by Sreekanth Odonnell MD, 05/22/21, 12:47 PM EDT.

## 2021-05-22 NOTE — PLAN OF CARE
Problem: Adult Inpatient Plan of Care  Goal: Plan of Care Review  Goal: Absence of Hospital-Acquired Illness or Injury  Intervention: Identify and Manage Fall Risk  Intervention: Prevent Skin Injury  Intervention: Prevent and Manage VTE (venous thromboembolism) Risk  Intervention: Prevent Infection  Goal: Optimal Comfort and Wellbeing  Intervention: Provide Person-Centered Care     Problem: Bleeding (Gastrointestinal Bleeding)  Goal: Hemostasis  Intervention: Manage Gastrointestinal Bleeding     Problem: Pain Acute  Goal: Optimal Pain Control  Intervention: Develop Pain Management Plan  Intervention: Optimize Psychosocial Wellbeing   Goal Outcome Evaluation:  Plan of Care Reviewed With: patient  Progress: improving  Outcome Summary: vss overnight. tolerating clear liquid diet. hgb stable-continue to monitor q8h&h. IVF, IV zosyn, and protonix gtt continue infusing overnight. voiding without issue. rested quietly between care. tylenol prn x1 for headache with relief. am labs.

## 2021-05-22 NOTE — PLAN OF CARE
Problem: Adult Inpatient Plan of Care  Goal: Plan of Care Review  Outcome: Ongoing, Progressing  Flowsheets (Taken 5/22/2021 6333)  Progress: improving  Plan of Care Reviewed With: patient  Outcome Summary: VSS, tolerating regular diet, tylenol for headache, up ad paddy, serial h&h, possible home in AM.  Goal: Patient-Specific Goal (Individualized)  Outcome: Ongoing, Progressing  Goal: Absence of Hospital-Acquired Illness or Injury  Outcome: Ongoing, Progressing  Intervention: Identify and Manage Fall Risk  Recent Flowsheet Documentation  Taken 5/22/2021 1545 by Shanthi Monge RN  Safety Promotion/Fall Prevention:   assistive device/personal items within reach   clutter free environment maintained   nonskid shoes/slippers when out of bed   room organization consistent   safety round/check completed  Taken 5/22/2021 1405 by Shanthi Monge RN  Safety Promotion/Fall Prevention:   assistive device/personal items within reach   clutter free environment maintained   nonskid shoes/slippers when out of bed   room organization consistent   safety round/check completed  Taken 5/22/2021 1154 by Shanthi Monge RN  Safety Promotion/Fall Prevention:   assistive device/personal items within reach   clutter free environment maintained   nonskid shoes/slippers when out of bed   safety round/check completed   room organization consistent  Taken 5/22/2021 1000 by Shanthi Monge RN  Safety Promotion/Fall Prevention:   assistive device/personal items within reach   clutter free environment maintained   nonskid shoes/slippers when out of bed   room organization consistent   safety round/check completed  Taken 5/22/2021 0748 by Shanthi Monge RN  Safety Promotion/Fall Prevention:   clutter free environment maintained   assistive device/personal items within reach   nonskid shoes/slippers when out of bed   safety round/check completed   room organization consistent  Intervention: Prevent Skin Injury  Recent  Flowsheet Documentation  Taken 5/22/2021 1545 by Shanthi Monge RN  Body Position: supine  Taken 5/22/2021 1405 by Shanthi Monge RN  Body Position: supine  Taken 5/22/2021 0748 by Shanthi Monge RN  Body Position: supine  Goal: Optimal Comfort and Wellbeing  Outcome: Ongoing, Progressing  Goal: Readiness for Transition of Care  Outcome: Ongoing, Progressing     Problem: Adjustment to Illness (Gastrointestinal Bleeding)  Goal: Optimal Coping with Acute Illness  Outcome: Ongoing, Progressing     Problem: Bleeding (Gastrointestinal Bleeding)  Goal: Hemostasis  Outcome: Ongoing, Progressing     Problem: Pain Acute  Goal: Optimal Pain Control  Outcome: Ongoing, Progressing  Intervention: Develop Pain Management Plan  Recent Flowsheet Documentation  Taken 5/22/2021 1112 by Shanthi Monge RN  Pain Management Interventions: see MAR   Goal Outcome Evaluation:  Plan of Care Reviewed With: patient  Progress: improving  Outcome Summary: VSS, tolerating regular diet, tylenol for headache, up ad paddy, serial h&h, possible home in AM.

## 2021-05-22 NOTE — PROGRESS NOTES
Name: Santosh Giordano ADMIT: 2021   : 1990  PCP: Jolly Dia MD    MRN: [id number] LOS: 2 days   AGE/SEX: 30 y.o. male  ROOM: [facility]/   Subjective   Chief Complaint   Patient presents with   • Black or Bloody Stool      No CP SOA NVD. No abdominal pain currently.    Objective   Vital Signs  Temp:  [97.7 °F (36.5 °C)-98 °F (36.7 °C)] 98 °F (36.7 °C)  Heart Rate:  [] 107  Resp:  [16-20] 16  BP: (109-122)/(54-75) 122/72  SpO2:  [96 %-100 %] 98 %  on   ;   Device (Oxygen Therapy): room air  Body mass index is 39.19 kg/m².    Physical Exam  Vitals and nursing note reviewed.   Constitutional:       General: He is not in acute distress.  HENT:      Head: Normocephalic and atraumatic.   Eyes:      Extraocular Movements: Extraocular movements intact.      Conjunctiva/sclera: Conjunctivae normal.   Cardiovascular:      Rate and Rhythm: Normal rate and regular rhythm.      Pulses: Normal pulses.   Pulmonary:      Effort: Pulmonary effort is normal.      Breath sounds: No wheezing or rales.   Abdominal:      General: There is no distension.      Palpations: Abdomen is soft.      Tenderness: There is no abdominal tenderness. There is no guarding or rebound.   Musculoskeletal:         General: No swelling or tenderness.      Cervical back: Neck supple. No tenderness.   Skin:     General: Skin is warm and dry.   Neurological:      Mental Status: He is alert. Mental status is at baseline.   Psychiatric:         Mood and Affect: Mood normal.         Behavior: Behavior normal.         Results Review:       I reviewed the patient's new clinical results.     I reviewed imaging, agree with interpretation.     I reviewed telemetry/EKG results, sinus.      Results from last 7 days   Lab Units 21  0806 21  2357 21  1622 21  0345 21  1636 21  1413   WBC 10*3/mm3 7.14  --   --  10.11 18.30* 13.90*   HEMOGLOBIN g/dL 8.6*  8.6* 9.7* 9.3* 10.0* 13.2 13.8   PLATELETS 10*3/mm3 192  --   --   228 277 303     Results from last 7 days   Lab Units 05/22/21  0806 05/21/21  0345 05/20/21  1636 05/20/21  1413   SODIUM mmol/L 141 139 134* 137   POTASSIUM mmol/L 3.7 4.0 4.2 4.4   CHLORIDE mmol/L 112* 110* 106 108*   CO2 mmol/L 24.0 23.7 21.4* 22.0   BUN mg/dL 6 15 27* 29*   CREATININE mg/dL 0.81 0.63* 0.64* 0.78   GLUCOSE mg/dL 100* 82 106* 117*   Estimated Creatinine Clearance: 156.6 mL/min (by C-G formula based on SCr of 0.81 mg/dL).  Results from last 7 days   Lab Units 05/22/21  0806 05/21/21  0345 05/20/21  1636 05/20/21  1413   CALCIUM mg/dL 8.1* 8.2* 8.8 9.3   ALBUMIN g/dL  --   --  4.10 4.20          pantoprazole, 40 mg, Oral, BID AC  piperacillin-tazobactam, 3.375 g, Intravenous, Q8H  sodium chloride, 10 mL, Intravenous, Q12H      sodium chloride, 30 mL/hr, Last Rate: 30 mL/hr (05/21/21 1343)    Diet Regular    Assessment/Plan      Active Hospital Problems    Diagnosis  POA   • **Upper GI bleed [K92.2]  Yes   • Diverticulitis [K57.92]  Yes   • Obesity (BMI 30-39.9) [E66.9]  Yes   • Acid reflux [K21.9]  Yes      Resolved Hospital Problems   No resolved problems to display.       · GIB: EGD with gastric ulcers, duodenitis, and duodenal ulcer with visible vessel requiring endoclips 5/21. Monitoring hgb trend. Transition PPI to oral possibly in the morning, GI would like gtt for today. Bx pending. GI following.  · GERD PPI  · Diverticulitis: On Zosyn. Follows with Dr Ibarra.  · Disposition: Home/possibly tomorrow    Reinaldo Tirado MD  [facility]  05/22/21  14:23 EDT    Dictated portions using Dragon dictation software.    During the entire encounter, I was wearing recommended PPE including face mask and eye protection. Hand sanitization was performed prior to entering room and upon exit.

## 2021-05-23 ENCOUNTER — READMISSION MANAGEMENT (OUTPATIENT)
Dept: CALL CENTER | Facility: HOSPITAL | Age: 31
End: 2021-05-23

## 2021-05-23 ENCOUNTER — INPATIENT HOSPITAL (OUTPATIENT)
Dept: URBAN - METROPOLITAN AREA HOSPITAL 113 | Facility: HOSPITAL | Age: 31
End: 2021-05-23
Payer: MEDICAID

## 2021-05-23 VITALS
HEIGHT: 66 IN | HEART RATE: 80 BPM | OXYGEN SATURATION: 97 % | SYSTOLIC BLOOD PRESSURE: 113 MMHG | WEIGHT: 242.8 LBS | DIASTOLIC BLOOD PRESSURE: 63 MMHG | RESPIRATION RATE: 16 BRPM | BODY MASS INDEX: 39.02 KG/M2 | TEMPERATURE: 98.6 F

## 2021-05-23 DIAGNOSIS — E66.9 OBESITY, UNSPECIFIED: ICD-10-CM

## 2021-05-23 DIAGNOSIS — K92.89 OTHER SPECIFIED DISEASES OF THE DIGESTIVE SYSTEM: ICD-10-CM

## 2021-05-23 DIAGNOSIS — K57.92 DIVERTICULITIS OF INTESTINE, PART UNSPECIFIED, WITHOUT PERFO: ICD-10-CM

## 2021-05-23 DIAGNOSIS — K21.9 GASTRO-ESOPHAGEAL REFLUX DISEASE WITHOUT ESOPHAGITIS: ICD-10-CM

## 2021-05-23 PROBLEM — K26.9 DUODENAL ULCER: Status: ACTIVE | Noted: 2021-05-23

## 2021-05-23 PROBLEM — K25.9 GASTRIC ULCER: Status: ACTIVE | Noted: 2021-05-23

## 2021-05-23 PROBLEM — D64.9 ANEMIA: Status: ACTIVE | Noted: 2021-05-23

## 2021-05-23 LAB
ANION GAP SERPL CALCULATED.3IONS-SCNC: 6 MMOL/L (ref 5–15)
BUN SERPL-MCNC: 5 MG/DL (ref 6–20)
BUN/CREAT SERPL: 6.2 (ref 7–25)
CALCIUM SPEC-SCNC: 8.2 MG/DL (ref 8.6–10.5)
CHLORIDE SERPL-SCNC: 111 MMOL/L (ref 98–107)
CO2 SERPL-SCNC: 24 MMOL/L (ref 22–29)
CREAT SERPL-MCNC: 0.81 MG/DL (ref 0.76–1.27)
DEPRECATED RDW RBC AUTO: 43.4 FL (ref 37–54)
ERYTHROCYTE [DISTWIDTH] IN BLOOD BY AUTOMATED COUNT: 12.8 % (ref 12.3–15.4)
FERRITIN SERPL-MCNC: 112 NG/ML (ref 30–400)
FOLATE SERPL-MCNC: 5.11 NG/ML (ref 4.78–24.2)
GFR SERPL CREATININE-BSD FRML MDRD: 113 ML/MIN/1.73
GLUCOSE SERPL-MCNC: 86 MG/DL (ref 65–99)
HCT VFR BLD AUTO: 23.1 % (ref 37.5–51)
HCT VFR BLD AUTO: 24.1 % (ref 37.5–51)
HCT VFR BLD AUTO: 24.2 % (ref 37.5–51)
HCYS SERPL-MCNC: 11.8 UMOL/L (ref 0–15)
HGB BLD-MCNC: 8 G/DL (ref 13–17.7)
HGB BLD-MCNC: 8.5 G/DL (ref 13–17.7)
HGB BLD-MCNC: 8.7 G/DL (ref 13–17.7)
IRON 24H UR-MRATE: 32 MCG/DL (ref 59–158)
IRON SATN MFR SERPL: 13 % (ref 20–50)
MCH RBC QN AUTO: 32.7 PG (ref 26.6–33)
MCHC RBC AUTO-ENTMCNC: 34.6 G/DL (ref 31.5–35.7)
MCV RBC AUTO: 94.3 FL (ref 79–97)
PLATELET # BLD AUTO: 183 10*3/MM3 (ref 140–450)
PMV BLD AUTO: 10.2 FL (ref 6–12)
POTASSIUM SERPL-SCNC: 3.5 MMOL/L (ref 3.5–5.2)
RBC # BLD AUTO: 2.45 10*6/MM3 (ref 4.14–5.8)
SODIUM SERPL-SCNC: 141 MMOL/L (ref 136–145)
TIBC SERPL-MCNC: 249 MCG/DL (ref 298–536)
TRANSFERRIN SERPL-MCNC: 167 MG/DL (ref 200–360)
TSH SERPL DL<=0.05 MIU/L-ACNC: 3.99 UIU/ML (ref 0.27–4.2)
VIT B12 BLD-MCNC: 278 PG/ML (ref 211–946)
WBC # BLD AUTO: 7.78 10*3/MM3 (ref 3.4–10.8)

## 2021-05-23 PROCEDURE — 85018 HEMOGLOBIN: CPT | Performed by: INTERNAL MEDICINE

## 2021-05-23 PROCEDURE — 99232 SBSQ HOSP IP/OBS MODERATE 35: CPT | Performed by: NURSE PRACTITIONER

## 2021-05-23 PROCEDURE — 84466 ASSAY OF TRANSFERRIN: CPT | Performed by: INTERNAL MEDICINE

## 2021-05-23 PROCEDURE — 83540 ASSAY OF IRON: CPT | Performed by: INTERNAL MEDICINE

## 2021-05-23 PROCEDURE — 85014 HEMATOCRIT: CPT | Performed by: INTERNAL MEDICINE

## 2021-05-23 PROCEDURE — 82728 ASSAY OF FERRITIN: CPT | Performed by: INTERNAL MEDICINE

## 2021-05-23 PROCEDURE — 84443 ASSAY THYROID STIM HORMONE: CPT | Performed by: INTERNAL MEDICINE

## 2021-05-23 PROCEDURE — 83090 ASSAY OF HOMOCYSTEINE: CPT | Performed by: INTERNAL MEDICINE

## 2021-05-23 PROCEDURE — 25010000002 PIPERACILLIN SOD-TAZOBACTAM PER 1 G: Performed by: INTERNAL MEDICINE

## 2021-05-23 PROCEDURE — 82746 ASSAY OF FOLIC ACID SERUM: CPT | Performed by: INTERNAL MEDICINE

## 2021-05-23 PROCEDURE — 82607 VITAMIN B-12: CPT | Performed by: INTERNAL MEDICINE

## 2021-05-23 PROCEDURE — 85027 COMPLETE CBC AUTOMATED: CPT | Performed by: INTERNAL MEDICINE

## 2021-05-23 PROCEDURE — 80048 BASIC METABOLIC PNL TOTAL CA: CPT | Performed by: INTERNAL MEDICINE

## 2021-05-23 RX ORDER — OMEPRAZOLE 40 MG/1
40 CAPSULE, DELAYED RELEASE ORAL 2 TIMES DAILY
Qty: 60 CAPSULE | Refills: 0 | Status: SHIPPED | OUTPATIENT
Start: 2021-05-23 | End: 2021-08-06

## 2021-05-23 RX ORDER — PANTOPRAZOLE SODIUM 40 MG/1
40 TABLET, DELAYED RELEASE ORAL
Status: DISCONTINUED | OUTPATIENT
Start: 2021-05-23 | End: 2021-05-23 | Stop reason: HOSPADM

## 2021-05-23 RX ORDER — AMOXICILLIN AND CLAVULANATE POTASSIUM 875; 125 MG/1; MG/1
1 TABLET, FILM COATED ORAL 2 TIMES DAILY
Qty: 10 TABLET | Refills: 0 | Status: SHIPPED | OUTPATIENT
Start: 2021-05-23 | End: 2021-05-28

## 2021-05-23 RX ORDER — LANOLIN ALCOHOL/MO/W.PET/CERES
1000 CREAM (GRAM) TOPICAL DAILY
Qty: 30 TABLET | Refills: 0 | Status: SHIPPED | OUTPATIENT
Start: 2021-05-23 | End: 2022-01-10

## 2021-05-23 RX ORDER — DOXYCYCLINE HYCLATE 50 MG/1
324 CAPSULE, GELATIN COATED ORAL
Qty: 30 TABLET | Refills: 0 | Status: SHIPPED | OUTPATIENT
Start: 2021-05-23 | End: 2022-01-10

## 2021-05-23 RX ADMIN — ACETAMINOPHEN 650 MG: 325 TABLET, FILM COATED ORAL at 01:09

## 2021-05-23 RX ADMIN — SODIUM CHLORIDE 8 MG/HR: 900 INJECTION INTRAVENOUS at 01:11

## 2021-05-23 RX ADMIN — SODIUM CHLORIDE 8 MG/HR: 900 INJECTION INTRAVENOUS at 06:09

## 2021-05-23 RX ADMIN — TAZOBACTAM SODIUM AND PIPERACILLIN SODIUM 3.38 G: 375; 3 INJECTION, SOLUTION INTRAVENOUS at 02:37

## 2021-05-23 RX ADMIN — TAZOBACTAM SODIUM AND PIPERACILLIN SODIUM 3.38 G: 375; 3 INJECTION, SOLUTION INTRAVENOUS at 11:16

## 2021-05-23 NOTE — PLAN OF CARE
Problem: Adult Inpatient Plan of Care  Goal: Plan of Care Review  Outcome: Ongoing, Progressing  Goal: Patient-Specific Goal (Individualized)  Outcome: Ongoing, Progressing  Goal: Absence of Hospital-Acquired Illness or Injury  Outcome: Ongoing, Progressing  Intervention: Identify and Manage Fall Risk  Intervention: Prevent Skin Injury  Intervention: Prevent and Manage VTE (venous thromboembolism) Risk  Intervention: Prevent Infection  Goal: Optimal Comfort and Wellbeing  Outcome: Ongoing, Progressing  Intervention: Provide Person-Centered Care  Goal: Readiness for Transition of Care  Outcome: Ongoing, Progressing     Problem: Adjustment to Illness (Gastrointestinal Bleeding)  Goal: Optimal Coping with Acute Illness  Outcome: Ongoing, Progressing  Intervention: Optimize Psychosocial Response to Unexpected Illness     Problem: Bleeding (Gastrointestinal Bleeding)  Goal: Hemostasis  Outcome: Ongoing, Progressing  Intervention: Manage Gastrointestinal Bleeding     Problem: Pain Acute  Goal: Optimal Pain Control  Outcome: Ongoing, Progressing  Intervention: Develop Pain Management Plan  Intervention: Prevent or Manage Pain  Intervention: Optimize Psychosocial Wellbeing   Goal Outcome Evaluation:  Plan of Care Reviewed With: patient  Progress: no change  Outcome Summary: vss overnight. pt c/o headache overnight-relieved with prn tylenol. continue protonix gtt and IV zosyn per order. pt tolerating regular diet this shift. rested quietly between care. wife at bedside. continue to monitor trend in hgb and any s/s bleeding.

## 2021-05-23 NOTE — PLAN OF CARE
Problem: Adult Inpatient Plan of Care  Goal: Plan of Care Review  Outcome: Met  Flowsheets (Taken 5/23/2021 5663)  Progress: improving  Plan of Care Reviewed With: patient  Goal: Patient-Specific Goal (Individualized)  Outcome: Met  Goal: Absence of Hospital-Acquired Illness or Injury  Outcome: Met  Goal: Optimal Comfort and Wellbeing  Outcome: Met  Goal: Readiness for Transition of Care  Outcome: Met     Problem: Adjustment to Illness (Gastrointestinal Bleeding)  Goal: Optimal Coping with Acute Illness  Outcome: Met     Problem: Bleeding (Gastrointestinal Bleeding)  Goal: Hemostasis  Outcome: Met     Problem: Pain Acute  Goal: Optimal Pain Control  Outcome: Met   Goal Outcome Evaluation:  Plan of Care Reviewed With: patient  Progress: improving

## 2021-05-23 NOTE — PROGRESS NOTES
LOS: 3 days   Patient Care Team:  Jolly Dia MD as PCP - General  Jolly Dia MD as PCP - Family Medicine  Jolly Dia MD      Subjective     Interval History: no events overnight    Subjective: hgb still low at 8. He has had some streak of dark stool but much improved compared to a few days ago. No abdominal pain or N/V. He reports that since midnight he has had some discomfort in chest with deep breathing. HR is tachy.       ROS:   Review of Systems   Constitutional: Negative.    HENT: Negative.    Respiratory: Positive for chest tightness.    Cardiovascular: Negative.    Gastrointestinal: Negative.  Negative for abdominal pain and blood in stool.   Genitourinary: Negative.    Musculoskeletal: Negative.    Skin: Negative.    Neurological: Negative.    Psychiatric/Behavioral: Negative.           Medication Review:     Current Facility-Administered Medications:   •  acetaminophen (TYLENOL) tablet 650 mg, 650 mg, Oral, Q4H PRN, 650 mg at 05/23/21 0109 **OR** acetaminophen (TYLENOL) 160 MG/5ML solution 650 mg, 650 mg, Oral, Q4H PRN **OR** acetaminophen (TYLENOL) suppository 650 mg, 650 mg, Rectal, Q4H PRN, Lashawn Handley MD  •  nitroglycerin (NITROSTAT) SL tablet 0.4 mg, 0.4 mg, Sublingual, Q5 Min PRN, Lashawn Handley MD  •  ondansetron (ZOFRAN) injection 4 mg, 4 mg, Intravenous, Q6H PRN, Lashawn Handley MD  •  pantoprazole (PROTONIX) EC tablet 40 mg, 40 mg, Oral, BID AC, Mitali Narvaez APRN  •  piperacillin-tazobactam (ZOSYN) 3.375 g in iso-osmotic dextrose 50 ml (premix), 3.375 g, Intravenous, Q8H, Lashawn Handley MD, 3.375 g at 05/23/21 0237  •  sodium chloride 0.9 % flush 10 mL, 10 mL, Intravenous, Q12H, Lashawn Handley MD, 10 mL at 05/22/21 2149  •  sodium chloride 0.9 % flush 10 mL, 10 mL, Intravenous, PRN, Lashawn Handley MD  •  sodium chloride 0.9 % infusion, 30 mL/hr, Intravenous, Continuous PRN, Lashawn Handley MD, Last Rate: 30 mL/hr at 05/21/21 1343, Restarted at 05/21/21 1428      Objective      Vital Signs  Vitals:    05/22/21 1302 05/22/21 1940 05/22/21 2337 05/23/21 0721   BP: 122/72 114/63 100/55 119/69   BP Location: Left arm Left arm Left arm Right arm   Patient Position: Lying Lying Lying Lying   Pulse: 107 90 77 112   Resp: 16 18 16 16   Temp: 98 °F (36.7 °C) 98.6 °F (37 °C) 98.8 °F (37.1 °C) 98.7 °F (37.1 °C)   TempSrc: Oral Oral Oral Oral   SpO2: 98%   97%   Weight:       Height:           Physical Exam: resting in chair    General Appearance:    Awake and alert, in no acute distress   Head:    Normocephalic, without obvious abnormality   Eyes:          Conjunctivae normal, anicteric sclera   Throat:   No oral lesions, no thrush, oral mucosa moist   Neck:   No adenopathy, supple, no JVD   Lungs:     Respirations regular, even and unlabored   Abdomen:     Soft, non-tender, non-distended, no rebound or guarding, no hepatosplenomegaly   Rectal:     Deferred   Extremities:   No edema, no cyanosis, moves equally bilaterally   Skin:   No bruising, no rash, no jaundice        Results Review:    Lab Results (last 24 hours)     Procedure Component Value Units Date/Time    TSH Rfx On Abnormal To Free T4 [[id number]] Collected: 05/23/21 0459    Specimen: Blood Updated: 05/23/21 1049    Ferritin [[id number]] Collected: 05/23/21 0459    Specimen: Blood Updated: 05/23/21 1049    Iron Profile [[id number]] Collected: 05/23/21 0459    Specimen: Blood Updated: 05/23/21 1049    Basic Metabolic Panel [[id number]]  (Abnormal) Collected: 05/23/21 0459    Specimen: Blood Updated: 05/23/21 0603     Glucose 86 mg/dL      BUN 5 mg/dL      Creatinine 0.81 mg/dL      Sodium 141 mmol/L      Potassium 3.5 mmol/L      Chloride 111 mmol/L      CO2 24.0 mmol/L      Calcium 8.2 mg/dL      eGFR Non African Amer 113 mL/min/1.73      BUN/Creatinine Ratio 6.2     Anion Gap 6.0 mmol/L     Narrative:      GFR Normal >60  Chronic Kidney Disease <60  Kidney Failure <15      CBC (No Diff) [[id number]]  (Abnormal) Collected: 05/23/21 0459     Specimen: Blood Updated: 05/23/21 0541     WBC 7.78 10*3/mm3      RBC 2.45 10*6/mm3      Hemoglobin 8.0 g/dL      Hematocrit 23.1 %      MCV 94.3 fL      MCH 32.7 pg      MCHC 34.6 g/dL      RDW 12.8 %      RDW-SD 43.4 fl      MPV 10.2 fL      Platelets 183 10*3/mm3     Hemoglobin & Hematocrit, Blood [[id number]]  (Abnormal) Collected: 05/22/21 2356    Specimen: Blood Updated: 05/23/21 0019     Hemoglobin 8.5 g/dL      Hematocrit 24.2 %     Blood Culture - Blood, Blood, Central Line [[id number]] Collected: 05/20/21 2006    Specimen: Blood, Central Line Updated: 05/22/21 2015     Blood Culture No growth at 2 days    Blood Culture - Blood, Arm, Right [[id number]] Collected: 05/20/21 1944    Specimen: Blood from Arm, Right Updated: 05/22/21 2000     Blood Culture No growth at 2 days    Hemoglobin & Hematocrit, Blood [[id number]]  (Abnormal) Collected: 05/22/21 1606    Specimen: Blood Updated: 05/22/21 1618     Hemoglobin 8.9 g/dL      Hematocrit 26.0 %           Imaging Results (Last 24 Hours)     ** No results found for the last 24 hours. **              ASSESSMENT:  31yo male with hx of recurrent diverticulitis, admitted with epigastric pain and melena. CT showed uncomplicated sigmoid diverticulitis. EGD on 5/21 showed erosive gastritis and duodenal bulb ulcer with visible vessel.   - duodenal ulcer with visible vessel - s/p gold probe, epi injection, clip  - non-bleeding erosive gastritis   - normocytic anemia   - diverticulitis - improving  - chest tightness      PLAN:  Patient continues to report feeling well and wants to go home. Melena has improved, however hgb remains low at 8. He also has new complaint this AM of chest tightness with deep breathing. If he is discharged, he needs close follow up and repeat cbc this week with PCP.   Transition to PPI BID.  On Zosyn.  No NSAIDs.  Will follow.         ANTONIETA Malave  05/23/21  11:05 EDT

## 2021-05-23 NOTE — OUTREACH NOTE
Prep Survey      Responses   [facility] patient discharged from?  [place]   Is LACE score < 7 ?  Yes   Emergency Room discharge w/ pulse ox?  No   Eligibility  [facility]   Date of Admission  05/20/21   Date of Discharge  05/23/21   Discharge Disposition  Home or Self Care   Discharge diagnosis  Duodenal ulcer  EGD with cauterization   Does the patient have one of the following disease processes/diagnoses(primary or secondary)?  Other   Does the patient have Home health ordered?  No   Is there a DME ordered?  No   Prep survey completed?  Yes          Radha Clemons RN

## 2021-05-23 NOTE — DISCHARGE SUMMARY
Date of Admission: 5/20/2021  Date of Discharge:  5/23/2021  Primary Care Physician: Jolly Dia MD     Discharge Diagnosis:  Active Hospital Problems    Diagnosis  POA   • **Duodenal ulcer [K26.9]  Yes   • Gastric ulcer [K25.9]  Yes   • Anemia [D64.9]  Yes   • Upper GI bleed [K92.2]  Yes   • Diverticulitis [K57.92]  Yes   • Obesity (BMI 30-39.9) [E66.9]  Yes   • Acid reflux [K21.9]  Yes      Resolved Hospital Problems   No resolved problems to display.       Presenting Problem/History of Present Illness:  Upper GI bleed [K92.2]  Acute diverticulitis [K57.92]     Mr. Giordano is a 30 y.o. male with a history of diverticulitis, GERD that presents to [facility] complaining of epigastric pain. Patient reports a constant dull ache to his epigastric region for past 2 weeks, no aggravating or alleviating factors. He reports that he noticed a black stool yesterday and has had several black stools today as well with associated weakness, no prior history of this in the past. He reports that he has a history of H. Pylori and diverticulitis, though states the pain he's having now is different than prior episodes. He was seen by PCP today, had labs drawn, and was told to come to ED for further workup due to his leukocytosis. He hasn't had anything to eat and little to drink today due due to nausea but denies vomiting. He also reports that he hasn't taken his prescription omeprazole in 2 weeks because he ran out. Patient denies fever, chest pain, shortness of breath, lower urinary tract symptoms, edema. He does confirm some diaphoresis, dizziness and lightheadedness today, no syncope. Labs done tonight show WBC of 18, up from 14 at PCP office today, but hemoglobin was 13.2. CT abdomen showed diverticulitis, blood cultures were drawn, and he was given dose of IV Zosyn while in ED tonight. Patient reports that he does use ibuprofen a few times a week, reports only very occasional alcohol use. Patient was  tachycardic on arrival but improved with IV fluids. Protonix drip was started as well and he reports improvement in his epigastric discomfort since that time.     Hospital Course:  The patient is a 30 y.o. male who presented with melena and acute blood loss anemia.  He was admitted started on Protonix drip and GI consulted.  On 5/21 he underwent EGD which showed nonbleeding gastric ulcers, duodenitis, duodenal ulcer with visible vessel requiring endoclips.  He was monitored and hemoglobin has stabilized.  His overt GI bleed has stopped and he is not having any abdominal pain.  Iron saturation is 13% so will give him oral iron supplementation.  He also has an indeterminate B12 level of 278.  Folate is normal at 5.1 however I am going to send for MMA and homocystine levels and will give oral B12 supplementation pending those results.  He is going to continue twice daily PPI.  Biopsies are pending which will include H. pylori testing.  He needs to follow-up with GI in clinic and will need to follow-up with his primary care.  CBC repeat in 1 week has been recommended and I have placed that order.    He had diverticulitis on admission.  He was given Zosyn for that and had improvement of lower GI symptoms.  He is being transitioned to Augmentin to complete his antibiotic course.  He has followed with Dr. Ibarra at for diverticulitis and should follow-up with her in clinic.    Exam Today:  Constitutional:       General: He is not in acute distress.  HENT:      Head: Normocephalic and atraumatic.   Eyes:      Extraocular Movements: Extraocular movements intact.      Conjunctiva/sclera: Conjunctivae normal.   Cardiovascular:      Rate and Rhythm: Normal rate and regular rhythm.      Pulses: Normal pulses.   Pulmonary:      Effort: Pulmonary effort is normal.      Breath sounds: No wheezing or rales.   Abdominal:      General: There is no distension.      Palpations: Abdomen is soft.      Tenderness: There is no abdominal  tenderness. There is no guarding or rebound.   Musculoskeletal:         General: No swelling or tenderness.      Cervical back: Neck supple. No tenderness.   Skin:     General: Skin is warm and dry.   Neurological:      Mental Status: He is alert. Mental status is at baseline.   Psychiatric:         Mood and Affect: Mood normal.         Behavior: Behavior normal.     Results:  CT Abdomen/Pelvis  Colonic diverticulosis with some subtle inflammatory change  around diverticulum near the junction of the descending colon with avoid  colon. This represents acute, uncomplicated diverticulitis. No other  abnormality is identified.      Procedures Performed:  Procedure(s):  ESOPHAGOGASTRODUODENOSCOPY with biopsy and cauterization of antrum ulcer with resolution clips x2 and epinephrine,  05/21 1256 UPPER GI ENDOSCOPY  - Z-line regular, 37 cm from the incisors.  - Small hiatal hernia.  - Non-bleeding gastric ulcers with no stigmata of bleeding.  - Gastritis. Biopsied.  - Normal third portion of the duodenum.  - One non-bleeding duodenal ulcer with a visible vessel. Treatment not successful. Treated with a monopolar probe. Injected. Clips (MR conditional) were placed.  - Duodenitis.  - Continue ppi gtt  - ok for clear liquid diet  - follow hemoglobin  - No NSAIDs    Consults:   Consults     Date and Time Order Name Status Description    5/20/2021  7:49 PM Inpatient Gastroenterology Consult Completed     5/20/2021  6:29 PM [facility] (on-call MD unless specified) Details Completed            Discharge Disposition:  Home or Self Care    Discharge Medications:     Discharge Medications      New Medications      Instructions Start Date   amoxicillin-clavulanate 875-125 MG per tablet  Commonly known as: Augmentin   1 tablet, Oral, 2 Times Daily      ferrous gluconate 324 MG tablet  Commonly known as: FERGON   324 mg, Oral, Daily With Breakfast      vitamin B-12 1000 MCG tablet  Commonly known as: CYANOCOBALAMIN   1,000 mcg, Oral, Daily          Changes to Medications      Instructions Start Date   omeprazole 40 MG capsule  Commonly known as: priLOSEC  What changed: when to take this   40 mg, Oral, 2 times daily         Continue These Medications      Instructions Start Date   TRUBIOTICS PO   Oral         Stop These Medications    ibuprofen 200 MG tablet  Commonly known as: ADVIL,MOTRIN            Discharge Diet:   Diet Instructions     Advance Diet As Tolerated            Activity at Discharge:   Activity Instructions     Activity as Tolerated            Follow-up Appointments:  Additional Instructions for the Follow-ups that You Need to Schedule     CBC (No Diff)    May 30, 2021 (Approximate)      Send results to Dr Dia, primary care    Order Comments: Send results to Dr Dia primary care     Release to patient: Immediate           Follow-up Information     Jolly Dia MD Follow up.    Specialty: Internal Medicine  Contact information:  [address]  [address]  [facility] [id number] [phone]             Lashawn Handley MD Follow up.    Specialty: Gastroenterology  Contact information:  [address]  [facility] [id number] [phone]                   Test Results Pending at Discharge:  Pending Labs     Order Current Status    Tissue Pathology Exam In process    Blood Culture - Blood, Arm, Right Preliminary result    Blood Culture - Blood, Blood, Central Line Preliminary result      MMA  Homocysteine     Reinaldo Tirado MD  05/23/21  13:28 EDT    Time Spent on Discharge Activities: >30 minutes    Dictated portions using Dragon dictation software.  During the entire encounter, I was wearing recommended PPE including face mask and eye protection. Hand sanitization was performed prior to entering room and upon exit.

## 2021-05-24 ENCOUNTER — TRANSITIONAL CARE MANAGEMENT TELEPHONE ENCOUNTER (OUTPATIENT)
Dept: CALL CENTER | Facility: HOSPITAL | Age: 31
End: 2021-05-24

## 2021-05-24 NOTE — PAYOR COMM NOTE
"Giordano, Santosh (30 y.o. Male)     ATTN: DISCHARGE SUMMARY TO REVIEW: [id number]     DEPT: FAX  [phone],    [phone]           Date of Birth Social Security Number Address Home Phone MRN    1990  [id number] [facility] [address] [phone] [id number]    [employer] Marital Status          Amish        Admission Date Admission Type Admitting Provider Attending Provider Department, Room/Bed    5/20/21 Emergency Johny Arrieta MD  [address], S422/1    Discharge Date Discharge Disposition Discharge Destination        5/23/2021 Home or Self Care              Attending Provider: (none)   Allergies: No Known Allergies    Isolation: None   Infection: None   Code Status: Prior    Ht: 167.6 cm (66\")   Wt: 110 kg (242 lb 12.8 oz)    Admission Cmt: None   Principal Problem: Duodenal ulcer [K26.9]                 Active Insurance as of 5/20/2021     Primary Coverage     Payor Plan Insurance Group Employer/Plan Group    [employer] BLUE [employer]OSS [employer] BLUE CROSS BLUE [employer]IELD PPO [id number]     Payor Plan Address Payor Plan Phone Number Payor Plan Fax Number Effective Dates    [address] [phone]  1/1/2019 - None Entered    [facility] [id number]       Subscriber Name Subscriber Birth Date Member ID       SANTOSH GIORDANO 1990 [id number]           Secondary Coverage     Payor Plan Insurance Group Employer/Plan Group    [facility] COMMUNITY PLAN [facility] COMMUNITY PLAN Levine, Susan. \Hospital Has a New Name and Outlook.\""     Payor Plan Address Payor Plan Phone Number Payor Plan Fax Number Effective Dates    [address]   1/1/2021 - None Entered    [facility] [phone]       Subscriber Name Subscriber Birth Date Member ID       SANTOSH GIORDANO 1990 [id number]                 Emergency Contacts      (Rel.) Home Phone Work Phone Mobile Phone    PasqualeLashawn (Spouse) [phone] -- [phone]               Operative/Procedure Notes (all)      Procedures signed by Lashawn Handley MD at " 05/21/21 1507   Version 1 of 1     Procedure Orders    1. UPPER GI ENDOSCOPY [[id number]] ordered by Lashawn Handley MD at 05/21/21 1256          Scan on 5/21/2021 1256 by Lashawn Handley MD: EGD          Electronically signed by Lashawn Handley MD at 05/21/21 1507          Discharge Summary      Reinaldo Tirado MD at 05/23/21 1328              Date of Admission: 5/20/2021  Date of Discharge:  5/23/2021  Primary Care Physician: Jolly Dia MD     Discharge Diagnosis:  Active Hospital Problems    Diagnosis  POA   • **Duodenal ulcer [K26.9]  Yes   • Gastric ulcer [K25.9]  Yes   • Anemia [D64.9]  Yes   • Upper GI bleed [K92.2]  Yes   • Diverticulitis [K57.92]  Yes   • Obesity (BMI 30-39.9) [E66.9]  Yes   • Acid reflux [K21.9]  Yes      Resolved Hospital Problems   No resolved problems to display.       Presenting Problem/History of Present Illness:  Upper GI bleed [K92.2]  Acute diverticulitis [K57.92]     Mr. Giordano is a 30 y.o. male with a history of diverticulitis, GERD that presents to [facility] complaining of epigastric pain. Patient reports a constant dull ache to his epigastric region for past 2 weeks, no aggravating or alleviating factors. He reports that he noticed a black stool yesterday and has had several black stools today as well with associated weakness, no prior history of this in the past. He reports that he has a history of H. Pylori and diverticulitis, though states the pain he's having now is different than prior episodes. He was seen by PCP today, had labs drawn, and was told to come to ED for further workup due to his leukocytosis. He hasn't had anything to eat and little to drink today due due to nausea but denies vomiting. He also reports that he hasn't taken his prescription omeprazole in 2 weeks because he ran out. Patient denies fever, chest pain, shortness of breath, lower urinary tract symptoms, edema. He does confirm some diaphoresis, dizziness and lightheadedness  today, no syncope. Labs done tonight show WBC of 18, up from 14 at PCP office today, but hemoglobin was 13.2. CT abdomen showed diverticulitis, blood cultures were drawn, and he was given dose of IV Zosyn while in ED tonight. Patient reports that he does use ibuprofen a few times a week, reports only very occasional alcohol use. Patient was tachycardic on arrival but improved with IV fluids. Protonix drip was started as well and he reports improvement in his epigastric discomfort since that time.     Hospital Course:  The patient is a 30 y.o. male who presented with melena and acute blood loss anemia.  He was admitted started on Protonix drip and GI consulted.  On 5/21 he underwent EGD which showed nonbleeding gastric ulcers, duodenitis, duodenal ulcer with visible vessel requiring endoclips.  He was monitored and hemoglobin has stabilized.  His overt GI bleed has stopped and he is not having any abdominal pain.  Iron saturation is 13% so will give him oral iron supplementation.  He also has an indeterminate B12 level of 278.  Folate is normal at 5.1 however I am going to send for MMA and homocystine levels and will give oral B12 supplementation pending those results.  He is going to continue twice daily PPI.  Biopsies are pending which will include H. pylori testing.  He needs to follow-up with GI in clinic and will need to follow-up with his primary care.  CBC repeat in 1 week has been recommended and I have placed that order.    He had diverticulitis on admission.  He was given Zosyn for that and had improvement of lower GI symptoms.  He is being transitioned to Augmentin to complete his antibiotic course.  He has followed with Dr. Ibarra at for diverticulitis and should follow-up with her in clinic.    Exam Today:  Constitutional:       General: He is not in acute distress.  HENT:      Head: Normocephalic and atraumatic.   Eyes:      Extraocular Movements: Extraocular movements intact.      Conjunctiva/sclera:  Conjunctivae normal.   Cardiovascular:      Rate and Rhythm: Normal rate and regular rhythm.      Pulses: Normal pulses.   Pulmonary:      Effort: Pulmonary effort is normal.      Breath sounds: No wheezing or rales.   Abdominal:      General: There is no distension.      Palpations: Abdomen is soft.      Tenderness: There is no abdominal tenderness. There is no guarding or rebound.   Musculoskeletal:         General: No swelling or tenderness.      Cervical back: Neck supple. No tenderness.   Skin:     General: Skin is warm and dry.   Neurological:      Mental Status: He is alert. Mental status is at baseline.   Psychiatric:         Mood and Affect: Mood normal.         Behavior: Behavior normal.     Results:  CT Abdomen/Pelvis  Colonic diverticulosis with some subtle inflammatory change  around diverticulum near the junction of the descending colon with avoid  colon. This represents acute, uncomplicated diverticulitis. No other  abnormality is identified.      Procedures Performed:  Procedure(s):  ESOPHAGOGASTRODUODENOSCOPY with biopsy and cauterization of antrum ulcer with resolution clips x2 and epinephrine,  05/21 1256 UPPER GI ENDOSCOPY  - Z-line regular, 37 cm from the incisors.  - Small hiatal hernia.  - Non-bleeding gastric ulcers with no stigmata of bleeding.  - Gastritis. Biopsied.  - Normal third portion of the duodenum.  - One non-bleeding duodenal ulcer with a visible vessel. Treatment not successful. Treated with a monopolar probe. Injected. Clips (MR conditional) were placed.  - Duodenitis.  - Continue ppi gtt  - ok for clear liquid diet  - follow hemoglobin  - No NSAIDs    Consults:   Consults     Date and Time Order Name Status Description    5/20/2021  7:49 PM Inpatient Gastroenterology Consult Completed     5/20/2021  6:29 PM [facility] (on-call MD unless specified) Details Completed            Discharge Disposition:  Home or Self Care    Discharge Medications:     Discharge Medications      New  Medications      Instructions Start Date   amoxicillin-clavulanate 875-125 MG per tablet  Commonly known as: Augmentin   1 tablet, Oral, 2 Times Daily      ferrous gluconate 324 MG tablet  Commonly known as: FERGON   324 mg, Oral, Daily With Breakfast      vitamin B-12 1000 MCG tablet  Commonly known as: CYANOCOBALAMIN   1,000 mcg, Oral, Daily         Changes to Medications      Instructions Start Date   omeprazole 40 MG capsule  Commonly known as: priLOSEC  What changed: when to take this   40 mg, Oral, 2 times daily         Continue These Medications      Instructions Start Date   TRUBIOTICS PO   Oral         Stop These Medications    ibuprofen 200 MG tablet  Commonly known as: ADVIL,MOTRIN            Discharge Diet:   Diet Instructions     Advance Diet As Tolerated            Activity at Discharge:   Activity Instructions     Activity as Tolerated            Follow-up Appointments:  Additional Instructions for the Follow-ups that You Need to Schedule     CBC (No Diff)    May 30, 2021 (Approximate)      Send results to Dr Dia, primary care    Order Comments: Send results to Dr Dia, primary care     Release to patient: Immediate           Follow-up Information     Jolly Dia MD Follow up.    Specialty: Internal Medicine  Contact information:  [address]  [address]  [facility] [id number] [phone]             Lashawn Handley MD Follow up.    Specialty: Gastroenterology  Contact information:  [address] [facility]  [facility] [id number] [phone]                   Test Results Pending at Discharge:  Pending Labs     Order Current Status    Tissue Pathology Exam In process    Blood Culture - Blood, Arm, Right Preliminary result    Blood Culture - Blood, Blood, Central Line Preliminary result      MMA  Homocysteine     Reinaldo Tirado MD  05/23/21  13:28 EDT    Time Spent on Discharge Activities: >30 minutes    Dictated portions using Dragon dictation software.  During the entire encounter, I  was wearing recommended PPE including face mask and eye protection. Hand sanitization was performed prior to entering room and upon exit.    Electronically signed by Reinaldo Tirado MD at 05/23/21 2022

## 2021-05-24 NOTE — OUTREACH NOTE
Call Center TCM Note      Responses   [facility] patient discharged from?  [place]   Does the patient have one of the following disease processes/diagnoses(primary or secondary)?  Other   TCM attempt successful?  No   Unsuccessful attempts  Attempt 2          Glenda Mattson MA    5/24/2021, 16:57 EDT

## 2021-05-24 NOTE — PAYOR COMM NOTE
"Santosh Giordano (30 y.o. 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        Admission Date Admission Type Admitting Provider Attending Provider Department, Room/Bed    5/20/21 Emergency Johny Arrieta MD  [address], S422/1    Discharge Date Discharge Disposition Discharge Destination        5/23/2021 Home or Self Care              Attending Provider: (none)   Allergies: No Known Allergies    Isolation: None   Infection: None   Code Status: Prior    Ht: 167.6 cm (66\")   Wt: 110 kg (242 lb 12.8 oz)    Admission Cmt: None   Principal Problem: Duodenal ulcer [K26.9]                 Active Insurance as of 5/20/2021     Primary Coverage     Payor Plan Insurance Group Employer/Plan Group    [employer] BLUE CROSS [employer] BLUE CROSS BLUE SHIELD PPO [id number]     Payor Plan Address Payor Plan Phone Number Payor Plan Fax Number Effective Dates    [address] [phone]  1/1/2019 - None Entered    [facility] [id number]       Subscriber Name Subscriber Birth Date Member ID       SANTOSH GIORDANO 1990 [id number]           Secondary Coverage     Payor Plan Insurance Group Employer/Plan Group    [facility] COMMUNITY PLAN [facility] COMMUNITY PLAN [facility]     Payor Plan Address Payor Plan Phone Number Payor Plan Fax Number Effective Dates    [address]   1/1/2021 - None Entered    [facility] [phone]       Subscriber Name Subscriber Birth Date Member ID       SANTOSH GIORDANO 1990 [id number]                 Emergency Contacts      (Rel.) Home Phone Work Phone Mobile Phone    Lashawn Giordano (Spouse) [phone] -- [phone]    "         Vital Signs (last 2 days) before discharge     Date/Time   Temp   Temp src   Pulse   Resp   BP   Patient Position   SpO2    05/23/21 1330   98.6 (37)   Oral   80   16   113/63   Lying   --    05/23/21 0721   98.7 (37.1)   Oral   112   16   119/69   Lying   97    05/22/21 2337   98.8 (37.1)   Oral   77   16   100/55   Lying   --    05/22/21 1940   98.6 (37)   Oral   90   18   114/63   Lying   --    05/22/21 1302   98 (36.7)   Oral   107   16   122/72   Lying   98    05/22/21 0755   98 (36.7)   Oral   80   16   112/66   Lying   96    05/21/21 2348   97.7 (36.5)   Oral   78   20   109/65   Lying   --    05/21/21 1944   97.8 (36.6)   Oral   80   18   114/67   Lying   100    05/21/21 1525   --   --   79   16   114/75   Lying   98    05/21/21 1515   --   --   77   16   113/66   Lying   97    05/21/21 1505   --   --   85   16   109/54   Lying   97    05/21/21 1338   --   --   86   16   117/61   Lying   98    05/21/21 0759   98 (36.7)   Oral   --   18   122/70   Lying   --            Intake & Output (last 2 days)       05/22 0701 - 05/23 0700 05/23 0701 - 05/24 0700 05/24 0701 - 05/25 0700    P.O. 480      I.V. (mL/kg)       IV Piggyback 300      Total Intake(mL/kg) 780 (7.1)      Net +780             Urine Unmeasured Occurrence 4 x 1 x     Stool Unmeasured Occurrence 1 x            Facility-Administered Medications as of 5/23/2021   Medication Dose Route Frequency Provider Last Rate Last Admin   • [COMPLETED] iopamidol (ISOVUE-300) 61 % injection 100 mL  100 mL Intravenous Once in imaging Paco Stoll MD   85 mL at 05/20/21 1741   • [COMPLETED] pantoprazole (PROTONIX) injection 80 mg  80 mg Intravenous Once Paco Stoll MD   80 mg at 05/20/21 1723   • [COMPLETED] piperacillin-tazobactam (ZOSYN) 3.375 g in iso-osmotic dextrose 50 ml (premix)  3.375 g Intravenous Once Paco Stoll MD   Stopped at 05/20/21 2045   • [COMPLETED] sodium chloride 0.9 % bolus 1,000 mL  1,000 mL Intravenous Once Jerman,  MD Paco   Stopped at 05/20/21 1704       Lab Results (last 72 hours)     Procedure Component Value Units Date/Time    Homocysteine [[id number]]  (Normal) Collected: 05/23/21 1150    Specimen: Blood Updated: 05/23/21 1353     Homocysteine, Plasma (Quant) 11.8 umol/L     Folate [[id number]]  (Normal) Collected: 05/23/21 1150    Specimen: Blood Updated: 05/23/21 1241     Folate 5.11 ng/mL     Narrative:      Results may be falsely increased if patient taking Biotin.      Vitamin B12 [[id number]]  (Normal) Collected: 05/23/21 1150    Specimen: Blood Updated: 05/23/21 1241     Vitamin B-12 278 pg/mL     Narrative:      Results may be falsely increased if patient taking Biotin.      Hemoglobin & Hematocrit, Blood [[id number]]  (Abnormal) Collected: 05/23/21 1150    Specimen: Blood Updated: 05/23/21 1159     Hemoglobin 8.7 g/dL      Hematocrit 24.1 %     TSH Rfx On Abnormal To Free T4 [[id number]]  (Normal) Collected: 05/23/21 0459    Specimen: Blood Updated: 05/23/21 1114     TSH 3.990 uIU/mL     Ferritin [[id number]]  (Normal) Collected: 05/23/21 0459    Specimen: Blood Updated: 05/23/21 1114     Ferritin 112.00 ng/mL     Narrative:      Results may be falsely decreased if patient taking Biotin.      Iron Profile [[id number]]  (Abnormal) Collected: 05/23/21 0459    Specimen: Blood Updated: 05/23/21 1108     Iron 32 mcg/dL      Iron Saturation 13 %      Transferrin 167 mg/dL      TIBC 249 mcg/dL     Basic Metabolic Panel [[id number]]  (Abnormal) Collected: 05/23/21 0459    Specimen: Blood Updated: 05/23/21 0603     Glucose 86 mg/dL      BUN 5 mg/dL      Creatinine 0.81 mg/dL      Sodium 141 mmol/L      Potassium 3.5 mmol/L      Chloride 111 mmol/L      CO2 24.0 mmol/L      Calcium 8.2 mg/dL      eGFR Non African Amer 113 mL/min/1.73      BUN/Creatinine Ratio 6.2     Anion Gap 6.0 mmol/L     Narrative:      GFR Normal >60  Chronic Kidney Disease <60  Kidney Failure <15      CBC (No Diff) [[id number]]  (Abnormal) Collected:  05/23/21 0459    Specimen: Blood Updated: 05/23/21 0541     WBC 7.78 10*3/mm3      RBC 2.45 10*6/mm3      Hemoglobin 8.0 g/dL      Hematocrit 23.1 %      MCV 94.3 fL      MCH 32.7 pg      MCHC 34.6 g/dL      RDW 12.8 %      RDW-SD 43.4 fl      MPV 10.2 fL      Platelets 183 10*3/mm3     Hemoglobin & Hematocrit, Blood [[id number]]  (Abnormal) Collected: 05/22/21 2356    Specimen: Blood Updated: 05/23/21 0019     Hemoglobin 8.5 g/dL      Hematocrit 24.2 %     Hemoglobin & Hematocrit, Blood [[id number]]  (Abnormal) Collected: 05/22/21 1606    Specimen: Blood Updated: 05/22/21 1618     Hemoglobin 8.9 g/dL      Hematocrit 26.0 %     Basic Metabolic Panel [[id number]]  (Abnormal) Collected: 05/22/21 0806    Specimen: Blood Updated: 05/22/21 0941     Glucose 100 mg/dL      BUN 6 mg/dL      Creatinine 0.81 mg/dL      Sodium 141 mmol/L      Potassium 3.7 mmol/L      Chloride 112 mmol/L      CO2 24.0 mmol/L      Calcium 8.1 mg/dL      eGFR Non African Amer 113 mL/min/1.73      BUN/Creatinine Ratio 7.4     Anion Gap 5.0 mmol/L     Narrative:      GFR Normal >60  Chronic Kidney Disease <60  Kidney Failure <15      Hemoglobin & Hematocrit, Blood [[id number]]  (Abnormal) Collected: 05/22/21 0806    Specimen: Blood Updated: 05/22/21 0910     Hemoglobin 8.6 g/dL      Hematocrit 24.4 %     CBC (No Diff) [[id number]]  (Abnormal) Collected: 05/22/21 0806    Specimen: Blood Updated: 05/22/21 0910     WBC 7.14 10*3/mm3      RBC 2.57 10*6/mm3      Hemoglobin 8.6 g/dL      Hematocrit 24.4 %      MCV 94.9 fL      MCH 33.5 pg      MCHC 35.2 g/dL      RDW 12.7 %      RDW-SD 43.6 fl      MPV 10.1 fL      Platelets 192 10*3/mm3     Hemoglobin & Hematocrit, Blood [[id number]]  (Abnormal) Collected: 05/21/21 2357    Specimen: Blood Updated: 05/22/21 0024     Hemoglobin 9.7 g/dL      Hematocrit 27.7 %        Orders (last 72 hrs)      Start     Ordered    05/24/21 0600  CBC & Differential  Morning Draw,   Status:  Canceled      05/23/21 1105    05/23/21  1730  pantoprazole (PROTONIX) EC tablet 40 mg  2 Times Daily Before Meals,   Status:  Discontinued      05/23/21 1105    05/23/21 1332  Homocysteine  Once      05/23/21 1331    05/23/21 1331  Methylmalonic Acid, Serum  Once,   Status:  Canceled      05/23/21 1331    05/23/21 1326  Discontinue IV  Once,   Status:  Canceled      05/23/21 1327    05/23/21 1319  Discharge patient  Once      05/23/21 1327    05/23/21 1146  Hemoglobin & Hematocrit, Blood  Once      05/23/21 1145    05/23/21 1004  Iron Profile  Once      05/23/21 1004    05/23/21 1004  Ferritin  Once      05/23/21 1004    05/23/21 1004  Folate  Once      05/23/21 1004    05/23/21 1004  Vitamin B12  Once      05/23/21 1004    05/23/21 1004  TSH Rfx On Abnormal To Free T4  Once      05/23/21 1004    05/23/21 0600  CBC (No Diff)  Morning Draw      05/22/21 1423    05/23/21 0600  Basic Metabolic Panel  Morning Draw      05/22/21 1423    05/23/21 0000  amoxicillin-clavulanate (Augmentin) 875-125 MG per tablet  2 Times Daily      05/23/21 1327    05/23/21 0000  ferrous gluconate (FERGON) 324 MG tablet  Daily With Breakfast      05/23/21 1327    05/23/21 0000  Activity as Tolerated      05/23/21 1327    05/23/21 0000  Advance Diet As Tolerated      05/23/21 1327    05/23/21 0000  CBC (No Diff)     Comments: Send results to Dr Dia, primary care      05/23/21 1327    05/23/21 0000  omeprazole (priLOSEC) 40 MG capsule  2 times daily      05/23/21 1327    05/23/21 0000  vitamin B-12 (CYANOCOBALAMIN) 1000 MCG tablet  Daily      05/23/21 1330    05/22/21 1730  pantoprazole (PROTONIX) EC tablet 40 mg  2 Times Daily Before Meals,   Status:  Discontinued      05/22/21 1422    05/22/21 1515  pantoprazole (PROTONIX) 40 mg in 100mL NS IVPB  Continuous,   Status:  Discontinued      05/22/21 1429    05/22/21 1247  Diet Regular  Diet Effective Now,   Status:  Canceled      05/22/21 1246    05/22/21 1135  Diet Full Liquid  Diet Effective Now,   Status:  Canceled      05/22/21  1135    05/22/21 0600  CBC (No Diff)  Morning Draw      05/21/21 1202    05/22/21 0600  Basic Metabolic Panel  Morning Draw      05/21/21 1202    05/21/21 1845  Patient May Shower  Once,   Status:  Canceled      05/21/21 1844    05/21/21 1507  Diet Clear Liquid  Diet Effective Now,   Status:  Canceled      05/21/21 1506    05/21/21 1457  EPINEPHrine PF (ADRENALIN) injection  As Needed,   Status:  Discontinued      05/21/21 1500    05/21/21 1446  Tissue Pathology Exam  RELEASE UPON ORDERING      05/21/21 1446    05/21/21 1333  sodium chloride 0.9 % infusion  Continuous PRN,   Status:  Discontinued      05/21/21 1333    05/21/21 1333  Implement Anesthesia orders day of procedure.  Once      05/21/21 1333    05/21/21 1333  Obtain informed consent  Once      05/21/21 1333    05/21/21 0858  Case Request  Once      05/21/21 0857    05/21/21 0308  piperacillin-tazobactam (ZOSYN) 3.375 g in iso-osmotic dextrose 50 ml (premix)  Every 8 Hours,   Status:  Discontinued      05/20/21 2023 05/21/21 0030  Hemoglobin & Hematocrit, Blood  Every 8 Hours,   Status:  Canceled      05/20/21 1949 05/20/21 2100  sodium chloride 0.9 % flush 10 mL  Every 12 Hours Scheduled,   Status:  Discontinued      05/20/21 1949 05/20/21 2000  Vital Signs  Every 4 Hours,   Status:  Canceled      05/20/21 1949 05/20/21 1951  sodium chloride 0.9 % infusion  Continuous,   Status:  Discontinued      05/20/21 1949 05/20/21 1949  ondansetron (ZOFRAN) injection 4 mg  Every 6 Hours PRN,   Status:  Discontinued      05/20/21 1949 05/20/21 1949  Intake & Output  Every Shift,   Status:  Canceled      05/20/21 1949 05/20/21 1948  Up With Assistance  As Needed,   Status:  Canceled      05/20/21 1949 05/20/21 1948  Telemetry - Pulse Oximetry  Continuous PRN,   Status:  Canceled     Comments: If Patient Develops Unresponsiveness, Acute Dyspnea, Cyanosis or Suspected Hypoxemia Start Continuous Pulse Ox Monitoring, Apply Oxygen & Notify Provider     05/20/21 1949 05/20/21 1948  Oxygen Therapy- Nasal Cannula; Titrate for SPO2: 90% - 95%  Continuous PRN,   Status:  Canceled     Comments: If Patient Develops Unresponsiveness, Acute Dyspnea, Cyanosis or Suspected Hypoxemia Start Continuous Pulse Ox Monitoring, Apply Oxygen & Notify Provider    05/20/21 1949 05/20/21 1948  nitroglycerin (NITROSTAT) SL tablet 0.4 mg  Every 5 Minutes PRN,   Status:  Discontinued      05/20/21 1949 05/20/21 1948  ECG 12 Lead  As Needed,   Status:  Canceled     Comments: Nurse to Release if Patient Expericences Acute Chest Pain or Dysrhythmias    05/20/21 1949 05/20/21 1948  Potassium  As Needed,   Status:  Canceled     Comments: For Ventricular Arrhythmias      05/20/21 1949 05/20/21 1948  Magnesium  As Needed,   Status:  Canceled     Comments: For Ventricular Arrhythmias      05/20/21 1949 05/20/21 1948  Troponin  As Needed,   Status:  Canceled     Comments: For Chest Pain      05/20/21 1949 05/20/21 1948  Blood Gas, Arterial -  As Needed,   Status:  Canceled     Comments: Per O2 PolicyNotify Physician      05/20/21 1949 05/20/21 1948  sodium chloride 0.9 % flush 10 mL  As Needed,   Status:  Discontinued      05/20/21 1949 05/20/21 1948  acetaminophen (TYLENOL) tablet 650 mg  Every 4 Hours PRN,   Status:  Discontinued      05/20/21 1949 05/20/21 1948  acetaminophen (TYLENOL) 160 MG/5ML solution 650 mg  Every 4 Hours PRN,   Status:  Discontinued      05/20/21 1949 05/20/21 1948  acetaminophen (TYLENOL) suppository 650 mg  Every 4 Hours PRN,   Status:  Discontinued      05/20/21 1949 05/20/21 1629  pantoprazole (PROTONIX) 40 mg in 100mL NS IVPB  Continuous,   Status:  Discontinued      05/20/21 1627    --  ibuprofen (ADVIL,MOTRIN) 200 MG tablet  Every 6 Hours PRN,   Status:  Discontinued      05/21/21 1346    --  UPPER GI ENDOSCOPY      05/21/21 1256                   Operative/Procedure Notes (all)      Procedures signed by Lashawn Handley MD at  05/21/21 1507   Version 1 of 1     Procedure Orders    1. UPPER GI ENDOSCOPY [[id number]] ordered by Lashawn Handley MD at 05/21/21 1256          Scan on 5/21/2021 1256 by Lashawn Handley MD: EGD          Electronically signed by Lashawn Handley MD at 05/21/21 1507          Physician Progress Notes (last 72 hours) (Notes from 05/21/21 0825 through 05/24/21 0825)      Mitali Narvaez APRN at 05/23/21 1105     Attestation signed by Sreekanth Odonnell MD at 05/23/21 1813    I have reviewed this documentation and agree.Patient seen and examined with Mitali Narvaez Nurse Practitioner on rounds .  Patient is alert and feels well,  no bloody bowel movements, his abdomen is nontender today.  Suspect duodenal ulcer is stable, needs to be on twice daily pantoprazole,  he is aware to return to hospital if any bleeding, black stools,  reviewed with him and his wife.  To folllowup with BGA                     LOS: 3 days   Patient Care Team:  Jolly Dia MD as PCP - General  Jolly Dia MD as PCP - Family Medicine  Jolly Dia MD      Subjective     Interval History: no events overnight    Subjective: hgb still low at 8. He has had some streak of dark stool but much improved compared to a few days ago. No abdominal pain or N/V. He reports that since midnight he has had some discomfort in chest with deep breathing. HR is tachy.       ROS:   Review of Systems   Constitutional: Negative.    HENT: Negative.    Respiratory: Positive for chest tightness.    Cardiovascular: Negative.    Gastrointestinal: Negative.  Negative for abdominal pain and blood in stool.   Genitourinary: Negative.    Musculoskeletal: Negative.    Skin: Negative.    Neurological: Negative.    Psychiatric/Behavioral: Negative.         Medication Review:     Current Facility-Administered Medications:   •  acetaminophen (TYLENOL) tablet 650 mg, 650 mg, Oral, Q4H PRN, 650 mg at 05/23/21 0109 **OR** acetaminophen (TYLENOL) 160 MG/5ML solution 650 mg, 650 mg, Oral,  Q4H PRN **OR** acetaminophen (TYLENOL) suppository 650 mg, 650 mg, Rectal, Q4H PRN, Lashawn Handley MD  •  nitroglycerin (NITROSTAT) SL tablet 0.4 mg, 0.4 mg, Sublingual, Q5 Min PRN, Lashawn Handley MD  •  ondansetron (ZOFRAN) injection 4 mg, 4 mg, Intravenous, Q6H PRN, Lashawn Handley MD  •  pantoprazole (PROTONIX) EC tablet 40 mg, 40 mg, Oral, BID AC, Mitali Narvaez APRN  •  piperacillin-tazobactam (ZOSYN) 3.375 g in iso-osmotic dextrose 50 ml (premix), 3.375 g, Intravenous, Q8H, Lashawn Handley MD, 3.375 g at 05/23/21 0237  •  sodium chloride 0.9 % flush 10 mL, 10 mL, Intravenous, Q12H, Lashawn Handley MD, 10 mL at 05/22/21 2149  •  sodium chloride 0.9 % flush 10 mL, 10 mL, Intravenous, PRN, Lashawn Handley MD  •  sodium chloride 0.9 % infusion, 30 mL/hr, Intravenous, Continuous PRN, Lashawn Handley MD, Last Rate: 30 mL/hr at 05/21/21 1343, Restarted at 05/21/21 1428      Objective     Vital Signs  Vitals:    05/22/21 1302 05/22/21 1940 05/22/21 2337 05/23/21 0721   BP: 122/72 114/63 100/55 119/69   BP Location: Left arm Left arm Left arm Right arm   Patient Position: Lying Lying Lying Lying   Pulse: 107 90 77 112   Resp: 16 18 16 16   Temp: 98 °F (36.7 °C) 98.6 °F (37 °C) 98.8 °F (37.1 °C) 98.7 °F (37.1 °C)   TempSrc: Oral Oral Oral Oral   SpO2: 98%   97%   Weight:       Height:           Physical Exam: resting in chair    General Appearance:    Awake and alert, in no acute distress   Head:    Normocephalic, without obvious abnormality   Eyes:          Conjunctivae normal, anicteric sclera   Throat:   No oral lesions, no thrush, oral mucosa moist   Neck:   No adenopathy, supple, no JVD   Lungs:     Respirations regular, even and unlabored   Abdomen:     Soft, non-tender, non-distended, no rebound or guarding, no hepatosplenomegaly   Rectal:     Deferred   Extremities:   No edema, no cyanosis, moves equally bilaterally   Skin:   No bruising, no rash, no jaundice        Results Review:    Lab Results  (last 24 hours)     Procedure Component Value Units Date/Time    TSH Rfx On Abnormal To Free T4 [[id number]] Collected: 05/23/21 0459    Specimen: Blood Updated: 05/23/21 1049    Ferritin [[id number]] Collected: 05/23/21 0459    Specimen: Blood Updated: 05/23/21 1049    Iron Profile [[id number]] Collected: 05/23/21 0459    Specimen: Blood Updated: 05/23/21 1049    Basic Metabolic Panel [[id number]]  (Abnormal) Collected: 05/23/21 0459    Specimen: Blood Updated: 05/23/21 0603     Glucose 86 mg/dL      BUN 5 mg/dL      Creatinine 0.81 mg/dL      Sodium 141 mmol/L      Potassium 3.5 mmol/L      Chloride 111 mmol/L      CO2 24.0 mmol/L      Calcium 8.2 mg/dL      eGFR Non African Amer 113 mL/min/1.73      BUN/Creatinine Ratio 6.2     Anion Gap 6.0 mmol/L     Narrative:      GFR Normal >60  Chronic Kidney Disease <60  Kidney Failure <15      CBC (No Diff) [[id number]]  (Abnormal) Collected: 05/23/21 0459    Specimen: Blood Updated: 05/23/21 0541     WBC 7.78 10*3/mm3      RBC 2.45 10*6/mm3      Hemoglobin 8.0 g/dL      Hematocrit 23.1 %      MCV 94.3 fL      MCH 32.7 pg      MCHC 34.6 g/dL      RDW 12.8 %      RDW-SD 43.4 fl      MPV 10.2 fL      Platelets 183 10*3/mm3     Hemoglobin & Hematocrit, Blood [[id number]]  (Abnormal) Collected: 05/22/21 2356    Specimen: Blood Updated: 05/23/21 0019     Hemoglobin 8.5 g/dL      Hematocrit 24.2 %     Blood Culture - Blood, Blood, Central Line [[id number]] Collected: 05/20/21 2006    Specimen: Blood, Central Line Updated: 05/22/21 2015     Blood Culture No growth at 2 days    Blood Culture - Blood, Arm, Right [[id number]] Collected: 05/20/21 1944    Specimen: Blood from Arm, Right Updated: 05/22/21 2000     Blood Culture No growth at 2 days    Hemoglobin & Hematocrit, Blood [[id number]]  (Abnormal) Collected: 05/22/21 1606    Specimen: Blood Updated: 05/22/21 1618     Hemoglobin 8.9 g/dL      Hematocrit 26.0 %           ASSESSMENT:  29yo male with hx of recurrent diverticulitis,  admitted with epigastric pain and melena. CT showed uncomplicated sigmoid diverticulitis. EGD on  showed erosive gastritis and duodenal bulb ulcer with visible vessel.   - duodenal ulcer with visible vessel - s/p gold probe, epi injection, clip  - non-bleeding erosive gastritis   - normocytic anemia   - diverticulitis - improving  - chest tightness      PLAN:  Patient continues to report feeling well and wants to go home. Melena has improved, however hgb remains low at 8. He also has new complaint this AM of chest tightness with deep breathing. If he is discharged, he needs close follow up and repeat cbc this week with PCP.   Transition to PPI BID.  On Zosyn.  No NSAIDs.  Will follow.         Mitali Narvaez, APRN  21  11:05 EDT        Electronically signed by Sreekanth Odonnell MD at 21 2243     Reinaldo Tirado MD at 21 1425              Name: Santosh Giordano ADMIT: 2021   : 1990  PCP: Jolly Dia MD    MRN: [id number] LOS: 2 days   AGE/SEX: 30 y.o. male  ROOM: [facility]   Subjective   Chief Complaint   Patient presents with   • Black or Bloody Stool      No CP SOA NVD. No abdominal pain currently.    Objective   Vital Signs  Temp:  [97.7 °F (36.5 °C)-98 °F (36.7 °C)] 98 °F (36.7 °C)  Heart Rate:  [] 107  Resp:  [16-20] 16  BP: (109-122)/(54-75) 122/72  SpO2:  [96 %-100 %] 98 %  on   ;   Device (Oxygen Therapy): room air  Body mass index is 39.19 kg/m².    Physical Exam  Vitals and nursing note reviewed.   Constitutional:       General: He is not in acute distress.  HENT:      Head: Normocephalic and atraumatic.   Eyes:      Extraocular Movements: Extraocular movements intact.      Conjunctiva/sclera: Conjunctivae normal.   Cardiovascular:      Rate and Rhythm: Normal rate and regular rhythm.      Pulses: Normal pulses.   Pulmonary:      Effort: Pulmonary effort is normal.      Breath sounds: No wheezing or rales.   Abdominal:      General: There is no distension.       Palpations: Abdomen is soft.      Tenderness: There is no abdominal tenderness. There is no guarding or rebound.   Musculoskeletal:         General: No swelling or tenderness.      Cervical back: Neck supple. No tenderness.   Skin:     General: Skin is warm and dry.   Neurological:      Mental Status: He is alert. Mental status is at baseline.   Psychiatric:         Mood and Affect: Mood normal.         Behavior: Behavior normal.         Results Review:       I reviewed the patient's new clinical results.     I reviewed imaging, agree with interpretation.     I reviewed telemetry/EKG results, sinus.      Results from last 7 days   Lab Units 05/22/21  0806 05/21/21  2357 05/21/21  1622 05/21/21  0345 05/20/21  1636 05/20/21  1413   WBC 10*3/mm3 7.14  --   --  10.11 18.30* 13.90*   HEMOGLOBIN g/dL 8.6*  8.6* 9.7* 9.3* 10.0* 13.2 13.8   PLATELETS 10*3/mm3 192  --   --  228 277 303     Results from last 7 days   Lab Units 05/22/21  0806 05/21/21  0345 05/20/21  1636 05/20/21  1413   SODIUM mmol/L 141 139 134* 137   POTASSIUM mmol/L 3.7 4.0 4.2 4.4   CHLORIDE mmol/L 112* 110* 106 108*   CO2 mmol/L 24.0 23.7 21.4* 22.0   BUN mg/dL 6 15 27* 29*   CREATININE mg/dL 0.81 0.63* 0.64* 0.78   GLUCOSE mg/dL 100* 82 106* 117*   Estimated Creatinine Clearance: 156.6 mL/min (by C-G formula based on SCr of 0.81 mg/dL).  Results from last 7 days   Lab Units 05/22/21  0806 05/21/21  0345 05/20/21  1636 05/20/21  1413   CALCIUM mg/dL 8.1* 8.2* 8.8 9.3   ALBUMIN g/dL  --   --  4.10 4.20          pantoprazole, 40 mg, Oral, BID AC  piperacillin-tazobactam, 3.375 g, Intravenous, Q8H  sodium chloride, 10 mL, Intravenous, Q12H      sodium chloride, 30 mL/hr, Last Rate: 30 mL/hr (05/21/21 1343)    Diet Regular    Assessment/Plan     Active Hospital Problems    Diagnosis  POA   • **Upper GI bleed [K92.2]  Yes   • Diverticulitis [K57.92]  Yes   • Obesity (BMI 30-39.9) [E66.9]  Yes   • Acid reflux [K21.9]  Yes      Resolved Hospital Problems    No resolved problems to display.       · GIB: EGD with gastric ulcers, duodenitis, and duodenal ulcer with visible vessel requiring endoclips . Monitoring hgb trend. Transition PPI to oral possibly in the morning, GI would like gtt for today. Bx pending. GI following.  · GERD PPI  · Diverticulitis: On Zosyn. Follows with Dr Ibarra.  · Disposition: Home/possibly tomorrow    Reinaldo Tirado MD  [facility]  21  14:23 EDT    Dictated portions using Dragon dictation software.    During the entire encounter, I was wearing recommended PPE including face mask and eye protection. Hand sanitization was performed prior to entering room and upon exit.    Electronically signed by Reinaldo Tirado MD at 21 1430     Sreekanth Odonnell MD at 21 1247           [facility]     Progress Note    Patient Name: Santosh Giordano  : 1990  MRN: [id number]  Primary Care Physician:  Jolly Dia MD  Date of admission: 2021    Subjective   Subjective     Chief Complaint: duodenal ulcer     History of Present Illness  Patient Reports black stools are less frequent. Feels better.     Review of Systems   Gastrointestinal: Positive for blood in stool.   Neurological: Positive for weakness.       Objective   Objective     Vitals:   Temp:  [97.7 °F (36.5 °C)-98 °F (36.7 °C)] 98 °F (36.7 °C)  Heart Rate:  [77-86] 80  Resp:  [16-20] 16  BP: (109-117)/(54-75) 112/66    Physical Exam  HENT:      Right Ear: External ear normal.      Left Ear: External ear normal.      Mouth/Throat:      Pharynx: Oropharynx is clear.   Eyes:      Conjunctiva/sclera: Conjunctivae normal.   Cardiovascular:      Rate and Rhythm: Normal rate.      Pulses: Normal pulses.   Pulmonary:      Effort: Pulmonary effort is normal.   Abdominal:      General: Abdomen is flat.   Skin:     General: Skin is warm.   Neurological:      General: No focal deficit present.      Mental Status: He is alert.   Psychiatric:         Mood  and Affect: Mood normal.          Result Review    Result Review:  I have personally reviewed the results from the time of this admission to 5/22/2021 12:47 EDT and agree with these findings:  []  Laboratory  []  Microbiology  []  Radiology  []  EKG/Telemetry   []  Cardiology/Vascular   []  Pathology  []  Old records  []  Other:      Assessment/Plan   Assessment / Plan     Brief Patient Summary:  Santosh Giordano is a 30 y.o. male   Duodenal ulcer with visible vessel s/p endoscopic treatment     Active Hospital Problems:  Active Hospital Problems    Diagnosis    • **Upper GI bleed    • Diverticulitis    • Obesity (BMI 30-39.9)    • Acid reflux      Plan: given the fact required endoscopic treatment, inclined wait another day and observe , possible discharge tomorrow or Monday to be safe     DVT prophylaxis:  Mechanical DVT prophylaxis orders are present.    CODE STATUS:    Code Status: CPR  Medical Interventions (Level of Support Prior to Arrest): Full      Electronically signed by Sreekanth Odonnell MD, 05/22/21, 12:47 PM EDT.             Electronically signed by Sreekanth Odonnell MD at 05/22/21 1249            Discharge Summary      Reinaldo Tirado MD at 05/23/21 1328              Date of Admission: 5/20/2021  Date of Discharge:  5/23/2021  Primary Care Physician: Jolly Dia MD     Discharge Diagnosis:  Active Hospital Problems    Diagnosis  POA   • **Duodenal ulcer [K26.9]  Yes   • Gastric ulcer [K25.9]  Yes   • Anemia [D64.9]  Yes   • Upper GI bleed [K92.2]  Yes   • Diverticulitis [K57.92]  Yes   • Obesity (BMI 30-39.9) [E66.9]  Yes   • Acid reflux [K21.9]  Yes      Resolved Hospital Problems   No resolved problems to display.       Presenting Problem/History of Present Illness:  Upper GI bleed [K92.2]  Acute diverticulitis [K57.92]     Mr. Giordano is a 30 y.o. male with a history of diverticulitis, GERD that presents to [facility] complaining of epigastric pain. Patient reports a constant dull ache to  his epigastric region for past 2 weeks, no aggravating or alleviating factors. He reports that he noticed a black stool yesterday and has had several black stools today as well with associated weakness, no prior history of this in the past. He reports that he has a history of H. Pylori and diverticulitis, though states the pain he's having now is different than prior episodes. He was seen by PCP today, had labs drawn, and was told to come to ED for further workup due to his leukocytosis. He hasn't had anything to eat and little to drink today due due to nausea but denies vomiting. He also reports that he hasn't taken his prescription omeprazole in 2 weeks because he ran out. Patient denies fever, chest pain, shortness of breath, lower urinary tract symptoms, edema. He does confirm some diaphoresis, dizziness and lightheadedness today, no syncope. Labs done tonight show WBC of 18, up from 14 at PCP office today, but hemoglobin was 13.2. CT abdomen showed diverticulitis, blood cultures were drawn, and he was given dose of IV Zosyn while in ED tonight. Patient reports that he does use ibuprofen a few times a week, reports only very occasional alcohol use. Patient was tachycardic on arrival but improved with IV fluids. Protonix drip was started as well and he reports improvement in his epigastric discomfort since that time.     Hospital Course:  The patient is a 30 y.o. male who presented with melena and acute blood loss anemia.  He was admitted started on Protonix drip and GI consulted.  On 5/21 he underwent EGD which showed nonbleeding gastric ulcers, duodenitis, duodenal ulcer with visible vessel requiring endoclips.  He was monitored and hemoglobin has stabilized.  His overt GI bleed has stopped and he is not having any abdominal pain.  Iron saturation is 13% so will give him oral iron supplementation.  He also has an indeterminate B12 level of 278.  Folate is normal at 5.1 however I am going to send for MMA and  homocystine levels and will give oral B12 supplementation pending those results.  He is going to continue twice daily PPI.  Biopsies are pending which will include H. pylori testing.  He needs to follow-up with GI in clinic and will need to follow-up with his primary care.  CBC repeat in 1 week has been recommended and I have placed that order.    He had diverticulitis on admission.  He was given Zosyn for that and had improvement of lower GI symptoms.  He is being transitioned to Augmentin to complete his antibiotic course.  He has followed with Dr. Ibarra at for diverticulitis and should follow-up with her in clinic.    Exam Today:  Constitutional:       General: He is not in acute distress.  HENT:      Head: Normocephalic and atraumatic.   Eyes:      Extraocular Movements: Extraocular movements intact.      Conjunctiva/sclera: Conjunctivae normal.   Cardiovascular:      Rate and Rhythm: Normal rate and regular rhythm.      Pulses: Normal pulses.   Pulmonary:      Effort: Pulmonary effort is normal.      Breath sounds: No wheezing or rales.   Abdominal:      General: There is no distension.      Palpations: Abdomen is soft.      Tenderness: There is no abdominal tenderness. There is no guarding or rebound.   Musculoskeletal:         General: No swelling or tenderness.      Cervical back: Neck supple. No tenderness.   Skin:     General: Skin is warm and dry.   Neurological:      Mental Status: He is alert. Mental status is at baseline.   Psychiatric:         Mood and Affect: Mood normal.         Behavior: Behavior normal.     Results:  CT Abdomen/Pelvis  Colonic diverticulosis with some subtle inflammatory change  around diverticulum near the junction of the descending colon with avoid  colon. This represents acute, uncomplicated diverticulitis. No other  abnormality is identified.      Procedures Performed:  Procedure(s):  ESOPHAGOGASTRODUODENOSCOPY with biopsy and cauterization of antrum ulcer with resolution  clips x2 and epinephrine,  05/21 1256 UPPER GI ENDOSCOPY  - Z-line regular, 37 cm from the incisors.  - Small hiatal hernia.  - Non-bleeding gastric ulcers with no stigmata of bleeding.  - Gastritis. Biopsied.  - Normal third portion of the duodenum.  - One non-bleeding duodenal ulcer with a visible vessel. Treatment not successful. Treated with a monopolar probe. Injected. Clips (MR conditional) were placed.  - Duodenitis.  - Continue ppi gtt  - ok for clear liquid diet  - follow hemoglobin  - No NSAIDs    Consults:   Consults     Date and Time Order Name Status Description    5/20/2021  7:49 PM Inpatient Gastroenterology Consult Completed     5/20/2021  6:29 PM [facility] (on-call MD unless specified) Details Completed            Discharge Disposition:  Home or Self Care    Discharge Medications:     Discharge Medications      New Medications      Instructions Start Date   amoxicillin-clavulanate 875-125 MG per tablet  Commonly known as: Augmentin   1 tablet, Oral, 2 Times Daily      ferrous gluconate 324 MG tablet  Commonly known as: FERGON   324 mg, Oral, Daily With Breakfast      vitamin B-12 1000 MCG tablet  Commonly known as: CYANOCOBALAMIN   1,000 mcg, Oral, Daily         Changes to Medications      Instructions Start Date   omeprazole 40 MG capsule  Commonly known as: priLOSEC  What changed: when to take this   40 mg, Oral, 2 times daily         Continue These Medications      Instructions Start Date   TRUBIOTICS PO   Oral         Stop These Medications    ibuprofen 200 MG tablet  Commonly known as: ADVIL,MOTRIN            Discharge Diet:   Diet Instructions     Advance Diet As Tolerated            Activity at Discharge:   Activity Instructions     Activity as Tolerated            Follow-up Appointments:  Additional Instructions for the Follow-ups that You Need to Schedule     CBC (No Diff)    May 30, 2021 (Approximate)      Send results to Dr Dia, primary care    Order Comments: Send results to Dr Dia,  primary care     Release to patient: Immediate           Follow-up Information     Jolly Dia MD Follow up.    Specialty: Internal Medicine  Contact information:  [address]  [address]  [facility] [id number] [phone]             Lashawn Handley MD Follow up.    Specialty: Gastroenterology  Contact information:  [address]  [address]  [facility] [id number] [phone]                   Test Results Pending at Discharge:  Pending Labs     Order Current Status    Tissue Pathology Exam In process    Blood Culture - Blood, Arm, Right Preliminary result    Blood Culture - Blood, Blood, Central Line Preliminary result      MMA  Homocysteine     Reinaldo Tirado MD  05/23/21  13:28 EDT    Time Spent on Discharge Activities: >30 minutes    Dictated portions using Dragon dictation software.  During the entire encounter, I was wearing recommended PPE including face mask and eye protection. Hand sanitization was performed prior to entering room and upon exit.    Electronically signed by Reinaldo Tirado MD at 05/23/21 1109

## 2021-05-24 NOTE — OUTREACH NOTE
Call Center TCM Note      Responses   [facility] patient discharged from?  [place]   Does the patient have one of the following disease processes/diagnoses(primary or secondary)?  Other   TCM attempt successful?  No   Unsuccessful attempts  Attempt 1   Call Status  Left message          Glenda Mattson MA    5/24/2021, 16:10 EDT

## 2021-05-24 NOTE — PROGRESS NOTES
Discharge Planning Assessment  [facility]     Patient Name: Santosh Giordano  MRN: [id number]  Today's Date: 5/24/2021    Admit Date: 5/20/2021    Discharge Needs Assessment    No documentation.       Discharge Plan     Row Name 05/24/21 1609       Plan    Plan  Home    Final Discharge Disposition Code  01 - home or self-care    Final Note  Home        Continued Care and Services - Discharged on 5/23/2021 Admission date: 5/20/2021 - Discharge disposition: Home or Self Care   Coordination has not been started for this encounter.       Expected Discharge Date and Time     Expected Discharge Date Expected Discharge Time    May 23, 2021         Demographic Summary    No documentation.       Functional Status    No documentation.       Psychosocial    No documentation.       Abuse/Neglect    No documentation.       Legal    No documentation.       Substance Abuse    No documentation.       Patient Forms    No documentation.           Estephanie Jung RN

## 2021-05-25 ENCOUNTER — TRANSITIONAL CARE MANAGEMENT TELEPHONE ENCOUNTER (OUTPATIENT)
Dept: CALL CENTER | Facility: HOSPITAL | Age: 31
End: 2021-05-25

## 2021-05-25 LAB
BACTERIA SPEC AEROBE CULT: NORMAL
BACTERIA SPEC AEROBE CULT: NORMAL

## 2021-05-25 NOTE — OUTREACH NOTE
Call Center TCM Note      Responses   [facility] patient discharged from?  [place]   Does the patient have one of the following disease processes/diagnoses(primary or secondary)?  Other   TCM attempt successful?  Yes   Discharge diagnosis  Duodenal ulcer  EGD with cauterization   Meds reviewed with patient/caregiver?  Yes   Is the patient having any side effects they believe may be caused by any medication additions or changes?  No   Does the patient have all medications ordered at discharge?  Yes   Is the patient taking all medications as directed (includes completed medication regime)?  Yes   Does the patient have a primary care provider?   Yes   Does the patient have an appointment with their PCP within 7 days of discharge?  No   Comments regarding PCP  Pt is returning to work tomorrow, and will need to check his sched before making appt for TCM FWP. Pt states he will call and sched this appt.    Has the patient kept scheduled appointments due by today?  N/A   Has home health visited the patient within 72 hours of discharge?  N/A   Psychosocial issues?  No   Did the patient receive a copy of their discharge instructions?  Yes   Nursing interventions  Reviewed instructions with patient   What is the patient's perception of their health status since discharge?  Improving   Is the patient/caregiver able to teach back signs and symptoms related to disease process for when to call PCP?  Yes   Is the patient/caregiver able to teach back signs and symptoms related to disease process for when to call 911?  Yes   Is the patient/caregiver able to teach back the hierarchy of who to call/visit for symptoms/problems? PCP, Specialist, Home health nurse, Urgent Care, ED, 911  Yes   TCM call completed?  Yes   Wrap up additional comments  Pt admits to some mild abdominal discomfort but overall doing pretty well. No bleeding or nausea, mo fever, chills. All new meds in place. No questions at this time. Pt is returning to  work tomorrow, and will need to check his sched before making appt for TCM FWP. Pt states he will call and sched this appt.           Glenda Mattson MA    5/25/2021, 16:14 EDT

## 2021-05-26 LAB
CYTO UR: NORMAL
LAB AP CASE REPORT: NORMAL
LAB AP DIAGNOSIS COMMENT: NORMAL
PATH REPORT.ADDENDUM SPEC: NORMAL
PATH REPORT.FINAL DX SPEC: NORMAL
PATH REPORT.GROSS SPEC: NORMAL

## 2021-05-27 DIAGNOSIS — B96.81 HELICOBACTER PYLORI GASTRITIS: Primary | ICD-10-CM

## 2021-05-27 DIAGNOSIS — K29.70 HELICOBACTER PYLORI GASTRITIS: Primary | ICD-10-CM

## 2021-05-27 RX ORDER — METRONIDAZOLE 250 MG/1
250 TABLET ORAL 4 TIMES DAILY
Qty: 56 TABLET | Refills: 0 | Status: SHIPPED | OUTPATIENT
Start: 2021-05-27 | End: 2022-01-10

## 2021-05-27 RX ORDER — DOXYCYCLINE 100 MG/1
100 CAPSULE ORAL 2 TIMES DAILY
Qty: 28 CAPSULE | Refills: 0 | Status: SHIPPED | OUTPATIENT
Start: 2021-05-27 | End: 2021-06-10

## 2021-05-27 RX ORDER — BISMUTH SUBSALICYLATE 262 MG
524 TABLET,CHEWABLE ORAL 4 TIMES DAILY
Qty: 112 TABLET | Refills: 0 | Status: SHIPPED | OUTPATIENT
Start: 2021-05-27 | End: 2022-01-10

## 2021-05-27 NOTE — PROGRESS NOTES
Biopsies from his EGD show active inflammation with H. pylori bacterial infectionQuadruple therapy ordered--he should start this after he has completed the antibiotics for the diverticulitis.He will need a test of cure 2-3 weeks after he has completed the treatment, I have ordered that as well.     Office follow up with Dr Delaney or Keila in ~6-8 weeks, thx

## 2021-06-23 ENCOUNTER — TELEPHONE (OUTPATIENT)
Dept: GASTROENTEROLOGY | Facility: CLINIC | Age: 31
End: 2021-06-23

## 2021-06-23 NOTE — TELEPHONE ENCOUNTER
Called pt and advised of Dr Handley note. Pt verb understanding . Due to pt's vacation f/u made for 08/06 with Keila BARRIOS . Pt to have breath test done on day of appt. Pt reminded to not take any ppi;s for 2 wks prior.  Verb understanding.

## 2021-06-23 NOTE — TELEPHONE ENCOUNTER
----- Message from Lashawn Handley MD sent at 5/27/2021  2:38 PM EDT -----  Biopsies from his EGD show active inflammation with H. pylori bacterial infectionQuadruple therapy ordered--he should start this after he has completed the antibiotics for the diverticulitis.He will need a test of cure 2-3 weeks after he has completed the treatment, I have ordered that as well.     Office follow up with Dr Delaney or Keila in ~6-8 weeks, x

## 2021-08-06 ENCOUNTER — OFFICE VISIT (OUTPATIENT)
Dept: GASTROENTEROLOGY | Facility: CLINIC | Age: 31
End: 2021-08-06

## 2021-08-06 VITALS — HEIGHT: 66 IN | TEMPERATURE: 95.7 F | BODY MASS INDEX: 38.7 KG/M2 | WEIGHT: 240.8 LBS

## 2021-08-06 DIAGNOSIS — K21.9 GASTROESOPHAGEAL REFLUX DISEASE, UNSPECIFIED WHETHER ESOPHAGITIS PRESENT: ICD-10-CM

## 2021-08-06 DIAGNOSIS — K29.70 HELICOBACTER PYLORI GASTRITIS: Primary | ICD-10-CM

## 2021-08-06 DIAGNOSIS — B96.81 HELICOBACTER PYLORI GASTRITIS: Primary | ICD-10-CM

## 2021-08-06 DIAGNOSIS — D50.8 OTHER IRON DEFICIENCY ANEMIA: ICD-10-CM

## 2021-08-06 LAB
BASOPHILS # BLD AUTO: 0.03 10*3/MM3 (ref 0–0.2)
BASOPHILS NFR BLD AUTO: 0.4 % (ref 0–1.5)
EOSINOPHIL # BLD AUTO: 0.35 10*3/MM3 (ref 0–0.4)
EOSINOPHIL NFR BLD AUTO: 4.8 % (ref 0.3–6.2)
ERYTHROCYTE [DISTWIDTH] IN BLOOD BY AUTOMATED COUNT: 12.1 % (ref 12.3–15.4)
FOLATE SERPL-MCNC: 6.19 NG/ML (ref 4.78–24.2)
HCT VFR BLD AUTO: 49.4 % (ref 37.5–51)
HGB BLD-MCNC: 16.6 G/DL (ref 13–17.7)
IMM GRANULOCYTES # BLD AUTO: 0.01 10*3/MM3 (ref 0–0.05)
IMM GRANULOCYTES NFR BLD AUTO: 0.1 % (ref 0–0.5)
IRON SATN MFR SERPL: 16 % (ref 20–50)
IRON SERPL-MCNC: 69 MCG/DL (ref 59–158)
LYMPHOCYTES # BLD AUTO: 2.06 10*3/MM3 (ref 0.7–3.1)
LYMPHOCYTES NFR BLD AUTO: 28.2 % (ref 19.6–45.3)
MCH RBC QN AUTO: 31.1 PG (ref 26.6–33)
MCHC RBC AUTO-ENTMCNC: 33.6 G/DL (ref 31.5–35.7)
MCV RBC AUTO: 92.7 FL (ref 79–97)
MONOCYTES # BLD AUTO: 0.45 10*3/MM3 (ref 0.1–0.9)
MONOCYTES NFR BLD AUTO: 6.2 % (ref 5–12)
NEUTROPHILS # BLD AUTO: 4.4 10*3/MM3 (ref 1.7–7)
NEUTROPHILS NFR BLD AUTO: 60.3 % (ref 42.7–76)
NRBC BLD AUTO-RTO: 0 /100 WBC (ref 0–0.2)
PLATELET # BLD AUTO: 185 10*3/MM3 (ref 140–450)
RBC # BLD AUTO: 5.33 10*6/MM3 (ref 4.14–5.8)
TIBC SERPL-MCNC: 437 MCG/DL
UIBC SERPL-MCNC: 368 MCG/DL (ref 112–346)
VIT B12 SERPL-MCNC: 323 PG/ML (ref 211–946)
WBC # BLD AUTO: 7.3 10*3/MM3 (ref 3.4–10.8)

## 2021-08-06 PROCEDURE — 99214 OFFICE O/P EST MOD 30 MIN: CPT | Performed by: NURSE PRACTITIONER

## 2021-08-06 RX ORDER — PANTOPRAZOLE SODIUM 40 MG/1
40 TABLET, DELAYED RELEASE ORAL DAILY
Qty: 30 TABLET | Refills: 5 | Status: SHIPPED | OUTPATIENT
Start: 2021-08-06 | End: 2022-05-27

## 2021-08-06 NOTE — PROGRESS NOTES
Chief Complaint   Patient presents with   • Anemia     HPI    Santosh Giordano is a  31 y.o. male here for a follow up visit for acute blood loss anemia status post hospital discharge.    She will consult conducted by Dr. Delaney, patient is new to me.    Hospital discharge summary from May reviewed as follows:    Hospital Course:  The patient is a 30 y.o. male who presented with melena and acute blood loss anemia.  He was admitted started on Protonix drip and GI consulted.  On 5/21 he underwent EGD which showed nonbleeding gastric ulcers, duodenitis, duodenal ulcer with visible vessel requiring endoclips.  He was monitored and hemoglobin has stabilized.  His overt GI bleed has stopped and he is not having any abdominal pain.  Iron saturation is 13% so will give him oral iron supplementation.  He also has an indeterminate B12 level of 278.  Folate is normal at 5.1 however I am going to send for MMA and homocystine levels and will give oral B12 supplementation pending those results.  He is going to continue twice daily PPI.  Biopsies are pending which will include H. pylori testing.  He needs to follow-up with GI in clinic and will need to follow-up with his primary care.  CBC repeat in 1 week has been recommended and I have placed that order.     He had diverticulitis on admission.  He was given Zosyn for that and had improvement of lower GI symptoms. He is being transitioned to Augmentin to complete his antibiotic course.  He has followed with Dr. Ibarra at for diverticulitis and should follow-up with her in clinic.    Reviewed EGD dated 5/21/2021 with small hiatal hernia, nonbleeding gastric ulcers, gastritis, 1 nonbleeding duodenal ulcer with visible vessel treated with monopolar probe, injected, and clips placed.  Duodenitis.    Pathology consistent with active inflammation with H. pylori.  Patient treated with quadruple therapy.  Patient recommended start antibiotics for H. pylori after completion of antibiotics  for diverticulitis.    On visit today he has been on H2 blocker therapy for 2 weeks in preparation for H. pylori breath testing.  He has had some episodes of acid reflux but overall feels much improved.  He does admit to dietary indiscretions and is aware of the fact that he needs to lose weight.  He currently denies nausea, vomiting, dysphagia, odynophagia.  He would like to transition back to PPI therapy after completion of H. pylori breath testing.    No diarrhea, constipation, or rectal bleeding.    Personal history of colon polyps gets colonoscopies with Dr. Ibarra.  Last colonoscopy was February of this year.  He postprocedure diagnosis was diverticulosis.  He be due for a follow-up colonoscopy in 5 years.    He needs follow-up labs today to reassess anemia.    Past Medical History:   Diagnosis Date   • COVID-19     Patient states he had Covid the week before Sara 12/20 Had positive test.  States he has no symptoms.   • Headache        Past Surgical History:   Procedure Laterality Date   • COLONOSCOPY N/A    • COLONOSCOPY N/A 2/1/2021    Procedure: COLONOSCOPY;  Surgeon: Kelli Ibarra MD;  Location: [facility] OR;  Service: Gastroenterology;  Laterality: N/A;   • ENDOSCOPY N/A 5/21/2021    Procedure: ESOPHAGOGASTRODUODENOSCOPY with biopsy and cauterization of antrum ulcer with resolution clips x2 and epinephrine,;  Surgeon: Lashawn Handley MD;  Location: [facility] ENDOSCOPY;  Service: Gastroenterology;  Laterality: N/A;  PRE:  epigastric pain and melenaPOST:  very small HH, z line at 37, 4 erosions and ulcerations in antrum, duodenitis,    • MOUTH SURGERY         Scheduled Meds:  Outpatient Encounter Medications as of 8/6/2021   Medication Sig Dispense Refill   • Probiotic Product (TRUBIOTICS PO) Take  by mouth.     • [DISCONTINUED] Famotidine (PEPCID PO) Take  by mouth.     • Bismuth 262 MG chewable tablet Chew 2 tablets 4 (Four) Times a Day. 112 tablet 0   • ferrous gluconate (FERGON) 324 MG tablet  Take 1 tablet by mouth Daily With Breakfast. 30 tablet 0   • metroNIDAZOLE (FLAGYL) 250 MG tablet Take 1 tablet by mouth 4 (Four) Times a Day. 56 tablet 0   • pantoprazole (PROTONIX) 40 MG EC tablet Take 1 tablet by mouth Daily. 30 tablet 5   • vitamin B-12 (CYANOCOBALAMIN) 1000 MCG tablet Take 1 tablet by mouth Daily. 30 tablet 0   • [DISCONTINUED] omeprazole (priLOSEC) 40 MG capsule Take 1 capsule by mouth 2 (two) times a day. 60 capsule 0     No facility-administered encounter medications on file as of 8/6/2021.       Continuous Infusions:No current facility-administered medications for this visit.      PRN Meds:.    No Known Allergies    Social History     Socioeconomic History   • Marital status:      Spouse name: Lashawn Giordano   • Number of children: 0   • Years of education: Not on file   • Highest education level: Not on file   Tobacco Use   • Smoking status: Light Tobacco Smoker     Packs/day: 0.25     Types: Cigarettes   • Smokeless tobacco: Never Used   • Tobacco comment: 1 pack every 3 days    Vaping Use   • Vaping Use: Never used   Substance and Sexual Activity   • Alcohol use: Yes     Comment: donnie   • Drug use: No   • Sexual activity: Defer       Family History   Problem Relation Age of Onset   • Heart failure Mother    • Kidney disease Mother    • Diabetes Mother    • Thyroid disease Father    • Heart disease Father    • Cancer Paternal Grandmother         breast       Review of Systems   Constitutional: Negative for activity change, appetite change, fatigue, fever and unexpected weight change.   HENT: Negative for trouble swallowing.    Respiratory: Negative for apnea, cough, choking, chest tightness, shortness of breath and wheezing.    Cardiovascular: Negative for chest pain, palpitations and leg swelling.   Gastrointestinal: Negative for abdominal distention, abdominal pain, anal bleeding, blood in stool, constipation, diarrhea, nausea, rectal pain and vomiting.        + Heartburn        Vitals:    08/06/21 0951   Temp: 95.7 °F (35.4 °C)       Physical Exam  Constitutional:       Appearance: He is well-developed.   Abdominal:      General: Bowel sounds are normal. There is no distension.      Palpations: Abdomen is soft. There is no mass.      Tenderness: There is no abdominal tenderness. There is no guarding.      Hernia: No hernia is present.   Musculoskeletal:         General: Normal range of motion.   Skin:     General: Skin is warm and dry.      Capillary Refill: Capillary refill takes less than 2 seconds.   Neurological:      Mental Status: He is alert and oriented to person, place, and time.   Psychiatric:         Behavior: Behavior normal.       No radiology results for the last 7 days    Diagnoses and all orders for this visit:    1. Helicobacter pylori gastritis (Primary)  -     H. Pylori Breath Test - Breath, Lung    2. Other iron deficiency anemia  -     CBC & Differential  -     Iron and TIBC  -     Vitamin B12 and Folate    3. Gastroesophageal reflux disease, unspecified whether esophagitis present    Other orders  -     pantoprazole (PROTONIX) 40 MG EC tablet; Take 1 tablet by mouth Daily.  Dispense: 30 tablet; Refill: 5    Assessment/plan    Santosh is seen today in follow-up.  He is doing much better.  He needs H. pylori breath testing performed today.  He also needs follow-up labs to reassess anemia, check iron stores, and evaluate B12 and folate.  Recommend he go back to Protonix 40 mg once daily after he completes HP breath test today.  Antireflux dietary precautions reviewed and reinforced with the patient.  Patient aware he needs to lose weight which would certainly help.  Diverticulitis resolved with antibiotics.  Patient had a colonoscopy earlier this year and will be due for a follow-up colonoscopy for screening purposes in 5 years.  At this point we will await breath testing results and serology and have him follow-up back in the office in 3 months.

## 2021-08-09 LAB — UREA BREATH TEST QL: POSITIVE

## 2021-08-12 ENCOUNTER — TELEPHONE (OUTPATIENT)
Dept: GASTROENTEROLOGY | Facility: CLINIC | Age: 31
End: 2021-08-12

## 2021-08-12 NOTE — TELEPHONE ENCOUNTER
Called pt and advised of Keila NP's note.  Pt verbalized understanding.    Rx's called into pt [facility] pharmacy.     Pt will call back and schedule his breath test.

## 2021-08-12 NOTE — PROGRESS NOTES
Please inform the patient he is still H. pylori positive.  Lets treat him with the LOAD regimen.  Please E scribe.    No anemia on labs.    Normal B12 and folate.    Have him arrange a follow-up breath test 6 weeks after completion of antibiotic therapy holding PPI therapy 2 weeks prior to breath testing.

## 2021-08-12 NOTE — TELEPHONE ENCOUNTER
----- Message from ANTONIETA Molina sent at 8/12/2021  1:00 PM EDT -----  Please inform the patient he is still H. pylori positive.  Lets treat him with the LOAD regimen.  Please E scribe.    No anemia on labs.    Normal B12 and folate.    Have him arrange a follow-up breath test 6 weeks after completion of antibiotic therapy holding PPI therapy 2 weeks prior to breath testing.

## 2021-11-01 ENCOUNTER — TELEPHONE (OUTPATIENT)
Dept: INTERNAL MEDICINE | Facility: CLINIC | Age: 31
End: 2021-11-01

## 2021-11-01 NOTE — TELEPHONE ENCOUNTER
Caller: Santosh Giordano    Relationship to patient: Self    Best call back number: [phone]    Patient is needing: PATIENT REQUESTING A CALLBACK FROM DR. DESAI'S NURSE. PATIENT DECLINED TO PROVIDE ANY DETAILS AND STATES IF SHE CAN JUST CALL ME BACK TODAY THAT WOULD BE GREAT

## 2021-11-16 ENCOUNTER — TELEPHONE (OUTPATIENT)
Dept: INTERNAL MEDICINE | Facility: CLINIC | Age: 31
End: 2021-11-16

## 2021-11-16 RX ORDER — AZITHROMYCIN 250 MG/1
TABLET, FILM COATED ORAL
Qty: 6 TABLET | Refills: 0 | Status: SHIPPED | OUTPATIENT
Start: 2021-11-16 | End: 2022-01-10

## 2021-11-16 RX ORDER — ALBUTEROL SULFATE 90 UG/1
2 AEROSOL, METERED RESPIRATORY (INHALATION) EVERY 4 HOURS PRN
Qty: 8 G | Refills: 1 | Status: SHIPPED | OUTPATIENT
Start: 2021-11-16 | End: 2021-11-24 | Stop reason: SDUPTHER

## 2021-11-16 NOTE — TELEPHONE ENCOUNTER
[facility] INFORMING PT THAT WE ARE SENDING IN A Z PACK. TO CALL BACK WITH ANY ISSUES      ----- Message from Jolly Dia MD sent at 11/16/2021  7:20 AM EST -----  Z pack  make sure he is using his steroid inhaler  ----- Message -----  From: Luana Cope MA  Sent: 11/15/2021   4:07 PM EST  To: Jolly Dia MD    Pt called wanting an antibiotic for cough, mucus that is green. This has been going on for 3 days. He can not get out of work to come in.

## 2021-11-24 RX ORDER — ALBUTEROL SULFATE 90 UG/1
2 AEROSOL, METERED RESPIRATORY (INHALATION) EVERY 4 HOURS PRN
Qty: 8 G | Refills: 1 | Status: SHIPPED | OUTPATIENT
Start: 2021-11-24 | End: 2021-11-30

## 2021-11-30 RX ORDER — ALBUTEROL SULFATE 90 UG/1
2 AEROSOL, METERED RESPIRATORY (INHALATION) EVERY 4 HOURS PRN
Qty: 8 G | Refills: 1 | Status: SHIPPED | OUTPATIENT
Start: 2021-11-30

## 2022-01-04 ENCOUNTER — TELEPHONE (OUTPATIENT)
Dept: INTERNAL MEDICINE | Facility: CLINIC | Age: 32
End: 2022-01-04

## 2022-01-04 NOTE — TELEPHONE ENCOUNTER
Caller: Santosh Giordano    Relationship: Self    Best call back number: [phone]     What orders are you requesting (i.e. lab or imaging): LABS    In what timeframe would the patient need to come in: PRIOR TO PHYSICAL ON 01/10/21    Where will you receive your lab/imaging services: AT OFFICE

## 2022-01-10 ENCOUNTER — OFFICE VISIT (OUTPATIENT)
Dept: INTERNAL MEDICINE | Facility: CLINIC | Age: 32
End: 2022-01-10

## 2022-01-10 VITALS
TEMPERATURE: 98.4 F | DIASTOLIC BLOOD PRESSURE: 84 MMHG | WEIGHT: 248.9 LBS | SYSTOLIC BLOOD PRESSURE: 118 MMHG | HEIGHT: 66 IN | OXYGEN SATURATION: 98 % | BODY MASS INDEX: 40 KG/M2 | HEART RATE: 93 BPM

## 2022-01-10 DIAGNOSIS — Z00.00 HEALTH CARE MAINTENANCE: Primary | ICD-10-CM

## 2022-01-10 DIAGNOSIS — K25.0 ACUTE GASTRIC ULCER WITH HEMORRHAGE: ICD-10-CM

## 2022-01-10 PROCEDURE — 99395 PREV VISIT EST AGE 18-39: CPT | Performed by: INTERNAL MEDICINE

## 2022-01-10 NOTE — PROGRESS NOTES
"Subjective   Santosh Giordano is a 31 y.o. male and is here for a comprehensive physical exam. The patient reports problems - gerd.    Pt has been compliant with meds for GERD.  No sx as long as pt takes medicine as prescribed.  No epigastric pain or reflux sx      Do you take any herbs or supplements that were not prescribed by a doctor?       Social History:   Social History     Socioeconomic History   • Marital status:      Spouse name: Lashawn Giordano   • Number of children: 0   Tobacco Use   • Smoking status: Light Tobacco Smoker     Packs/day: 0.25     Types: Cigarettes   • Smokeless tobacco: Never Used   • Tobacco comment: 1 pack every 3 days    Vaping Use   • Vaping Use: Never used   Substance and Sexual Activity   • Alcohol use: Yes     Comment: donnie   • Drug use: No   • Sexual activity: Defer       Family History:   Family History   Problem Relation Age of Onset   • Heart failure Mother    • Kidney disease Mother    • Diabetes Mother    • Thyroid disease Father    • Heart disease Father    • Cancer Paternal Grandmother         breast   • Stroke Sister    • Seizures Sister        Past Medical History:   Past Medical History:   Diagnosis Date   • COVID-19     Patient states he had Covid the week before [place] 12/20 Had positive test.  States he has no symptoms.   • Headache            Review of Systems    A comprehensive review of systems was negative.    Objective   /84 (BP Location: Left arm, Patient Position: Sitting)   Pulse 93   Temp 98.4 °F (36.9 °C) (Infrared)   Ht 167.6 cm (66\")   Wt 113 kg (248 lb 14.4 oz)   SpO2 98%   BMI 40.17 kg/m²     General Appearance:    Alert, cooperative, no distress, appears stated age   Head:    Normocephalic, without obvious abnormality, atraumatic   Eyes:    PERRL, conjunctiva/corneas clear, EOM's intact, fundi     benign, both eyes        Ears:    Normal TM's and external ear canals, both ears   Nose:   Nares normal, septum midline, mucosa normal, no " drainage    or sinus tenderness   Throat:   Lips, mucosa, and tongue normal; teeth and gums normal   Neck:   Supple, symmetrical, trachea midline, no adenopathy;        thyroid:  No enlargement/tenderness/nodules; no carotid    bruit or JVD   Back:     Symmetric, no curvature, ROM normal, no CVA tenderness   Lungs:     Clear to auscultation bilaterally, respirations unlabored   Chest wall:    No tenderness or deformity   Heart:    Regular rate and rhythm, S1 and S2 normal, no murmur, rub   or gallop   Abdomen:     Soft, non-tender, bowel sounds active all four quadrants,     no masses, no organomegaly           Extremities:   Extremities normal, atraumatic, no cyanosis or edema   Pulses:   2+ and symmetric all extremities   Skin:   Skin color, texture, turgor normal, no rashes or lesions   Lymph nodes:   Cervical, supraclavicular, and axillary nodes normal   Neurologic:   CNII-XII intact. Normal strength, sensation and reflexes       throughout       Vitals:    01/10/22 1043   BP: 118/84   Pulse: 93   Temp: 98.4 °F (36.9 °C)   SpO2: 98%     Body mass index is 40.17 kg/m².      Medications:   Current Outpatient Medications:   •  albuterol sulfate  (90 Base) MCG/ACT inhaler, Inhale 2 puffs Every 4 (Four) Hours As Needed for Wheezing., Disp: 8 g, Rfl: 1  •  pantoprazole (PROTONIX) 40 MG EC tablet, Take 1 tablet by mouth Daily., Disp: 30 tablet, Rfl: 5  •  Probiotic Product (TRUBIOTICS PO), Take  by mouth., Disp: , Rfl:        Assessment/Plan   Healthy male exam.      1. Healthcare Maintenance:  2. Patient Counseling:  --Nutrition: Stressed importance of moderation in sodium/caffeine intake, saturated fat and cholesterol, caloric balance, sufficient intake of fresh fruits, vegetables, fiber, calcium and vit D  --Exercise: he does exercise  --Substance Abuse: + tob rare etih  --Dental health:  He does go to the dentist reg   --Immunizations reviewed.UTD with vaccines  --Discussed benefits of screening  colonoscopy.work on colonoscopy  3. GIB-  Needs to take the protnoix in the a,m with pepcid ac at night as needed if sx cont will cont may need a GI fu and another scope  4.  TOb abuse-  rec quitting

## 2022-01-11 LAB
ALBUMIN SERPL-MCNC: 4.4 G/DL (ref 4.1–5.2)
ALBUMIN/GLOB SERPL: 2 {RATIO} (ref 1.2–2.2)
ALP SERPL-CCNC: 67 IU/L (ref 44–121)
ALT SERPL-CCNC: 39 IU/L (ref 0–44)
AST SERPL-CCNC: 23 IU/L (ref 0–40)
BASOPHILS # BLD AUTO: 0 X10E3/UL (ref 0–0.2)
BASOPHILS NFR BLD AUTO: 0 %
BILIRUB SERPL-MCNC: 0.6 MG/DL (ref 0–1.2)
BUN SERPL-MCNC: 9 MG/DL (ref 6–20)
BUN/CREAT SERPL: 11 (ref 9–20)
CALCIUM SERPL-MCNC: 9.5 MG/DL (ref 8.7–10.2)
CHLORIDE SERPL-SCNC: 109 MMOL/L (ref 96–106)
CHOLEST SERPL-MCNC: 194 MG/DL (ref 100–199)
CO2 SERPL-SCNC: 20 MMOL/L (ref 20–29)
CREAT SERPL-MCNC: 0.85 MG/DL (ref 0.76–1.27)
EOSINOPHIL # BLD AUTO: 0.3 X10E3/UL (ref 0–0.4)
EOSINOPHIL NFR BLD AUTO: 5 %
ERYTHROCYTE [DISTWIDTH] IN BLOOD BY AUTOMATED COUNT: 12.4 % (ref 11.6–15.4)
GLOBULIN SER CALC-MCNC: 2.2 G/DL (ref 1.5–4.5)
GLUCOSE SERPL-MCNC: 93 MG/DL (ref 65–99)
HCT VFR BLD AUTO: 47.5 % (ref 37.5–51)
HDLC SERPL-MCNC: 33 MG/DL
HGB BLD-MCNC: 16.2 G/DL (ref 13–17.7)
IMM GRANULOCYTES # BLD AUTO: 0 X10E3/UL (ref 0–0.1)
IMM GRANULOCYTES NFR BLD AUTO: 0 %
IRON SATN MFR SERPL: 21 % (ref 15–55)
IRON SERPL-MCNC: 63 UG/DL (ref 38–169)
LDLC SERPL CALC-MCNC: 146 MG/DL (ref 0–99)
LDLC/HDLC SERPL: 4.4 RATIO (ref 0–3.6)
LYMPHOCYTES # BLD AUTO: 2.1 X10E3/UL (ref 0.7–3.1)
LYMPHOCYTES NFR BLD AUTO: 31 %
MCH RBC QN AUTO: 31.9 PG (ref 26.6–33)
MCHC RBC AUTO-ENTMCNC: 34.1 G/DL (ref 31.5–35.7)
MCV RBC AUTO: 94 FL (ref 79–97)
MONOCYTES # BLD AUTO: 0.4 X10E3/UL (ref 0.1–0.9)
MONOCYTES NFR BLD AUTO: 7 %
NEUTROPHILS # BLD AUTO: 3.8 X10E3/UL (ref 1.4–7)
NEUTROPHILS NFR BLD AUTO: 57 %
PLATELET # BLD AUTO: 196 X10E3/UL (ref 150–450)
POTASSIUM SERPL-SCNC: 4.2 MMOL/L (ref 3.5–5.2)
PROT SERPL-MCNC: 6.6 G/DL (ref 6–8.5)
RBC # BLD AUTO: 5.08 X10E6/UL (ref 4.14–5.8)
SODIUM SERPL-SCNC: 139 MMOL/L (ref 134–144)
TIBC SERPL-MCNC: 296 UG/DL (ref 250–450)
TRIGL SERPL-MCNC: 82 MG/DL (ref 0–149)
TSH SERPL DL<=0.005 MIU/L-ACNC: 1.29 UIU/ML (ref 0.45–4.5)
UIBC SERPL-MCNC: 233 UG/DL (ref 111–343)
VIT B12 SERPL-MCNC: 255 PG/ML (ref 232–1245)
VLDLC SERPL CALC-MCNC: 15 MG/DL (ref 5–40)
WBC # BLD AUTO: 6.6 X10E3/UL (ref 3.4–10.8)

## 2022-05-24 ENCOUNTER — TELEPHONE (OUTPATIENT)
Dept: INTERNAL MEDICINE | Facility: CLINIC | Age: 32
End: 2022-05-24

## 2022-05-24 NOTE — TELEPHONE ENCOUNTER
PATIENT STATES HE IS CHANGING LIFE INSURANCE PLANS AND THE NEW PLAN [employer] NEEDS HIS MEDICAL RECORDS FROM US. PATIENT STATES HE REQUESTED THIS A WHILE BACK, ANY UPDATE??? PATIENT IS CALLING MED RECORDS DEPT AS WELL.       PATIENT> [phone]

## 2022-05-24 NOTE — TELEPHONE ENCOUNTER
Medical Records are not done in the office. It shows that  [facility] de[facility] has received the records request. He will need to contact Medical records at [phone] or [phone]

## 2022-05-27 RX ORDER — PANTOPRAZOLE SODIUM 40 MG/1
TABLET, DELAYED RELEASE ORAL
Qty: 90 TABLET | Refills: 3 | Status: SHIPPED | OUTPATIENT
Start: 2022-05-27 | End: 2022-09-26 | Stop reason: SDUPTHER

## 2022-09-26 RX ORDER — PANTOPRAZOLE SODIUM 40 MG/1
40 TABLET, DELAYED RELEASE ORAL DAILY
Qty: 90 TABLET | Refills: 1 | Status: SHIPPED | OUTPATIENT
Start: 2022-09-26 | End: 2023-02-13

## 2022-09-26 NOTE — TELEPHONE ENCOUNTER
Caller: Santosh Giordano    Relationship: Self    Best call back number: [phone]    Requested Prescriptions:   Requested Prescriptions     Pending Prescriptions Disp Refills   • pantoprazole (PROTONIX) 40 MG EC tablet 90 tablet 3     Sig: Take 1 tablet by mouth Daily.        Pharmacy where request should be sent: [id number] - [place], [place] - [address] [place] [address] [phone] PH - [phone] FX     Additional details provided by patient: PATIENT IS OUT OF MEDICATION. LIVING IN [place] NOW AND HAS NOT FOUND A NEW PROVIDER TO PRESCRIBE MEDICATION YET.    Does the patient have less than a 3 day supply:  [x] Yes  [] No    Beba Santos Rep   09/26/22 11:11 EDT

## 2023-02-06 ENCOUNTER — TELEPHONE (OUTPATIENT)
Dept: INTERNAL MEDICINE | Facility: CLINIC | Age: 33
End: 2023-02-06
Payer: COMMERCIAL

## 2023-02-06 NOTE — TELEPHONE ENCOUNTER
Spoke with patient and told him we cannot do video visits over state lines. I told him he would need find an urgent care down in [place].  He understood.

## 2023-02-06 NOTE — TELEPHONE ENCOUNTER
Caller: Santosh Giordano    Relationship: Self    Best call back number: [phone]     What was the call regarding: PATIENT IS CALLING TO SET UP A VIRTUAL VISIT WITH DR. DESAI, PATIENT IS CURRENTLY IN [place]. PATIENT SPOKE WITH HUB ABOUT HOW DR. DESAI SAID SHE COULD SEE HIM OVER THE PHONE. PATIENT WANTED THE APPOINTMENT TO DISCUSS HIS STOMACH ISSUES, AND THE LIGHT PAIN IN HIS STOMACH THAT HE IS EXPERIENCING. PLEASE ADVISE.     Do you require a callback: YES

## 2023-02-13 ENCOUNTER — OFFICE VISIT (OUTPATIENT)
Dept: INTERNAL MEDICINE | Facility: CLINIC | Age: 33
End: 2023-02-13
Payer: COMMERCIAL

## 2023-02-13 ENCOUNTER — HOSPITAL ENCOUNTER (OUTPATIENT)
Dept: GENERAL RADIOLOGY | Facility: HOSPITAL | Age: 33
Discharge: HOME | End: 2023-02-13
Admitting: INTERNAL MEDICINE
Payer: COMMERCIAL

## 2023-02-13 VITALS
OXYGEN SATURATION: 96 % | DIASTOLIC BLOOD PRESSURE: 70 MMHG | BODY MASS INDEX: 39.86 KG/M2 | HEART RATE: 92 BPM | WEIGHT: 248 LBS | HEIGHT: 66 IN | TEMPERATURE: 96.6 F | SYSTOLIC BLOOD PRESSURE: 122 MMHG

## 2023-02-13 DIAGNOSIS — R05.1 ACUTE COUGH: ICD-10-CM

## 2023-02-13 DIAGNOSIS — Z00.00 HEALTH CARE MAINTENANCE: Primary | ICD-10-CM

## 2023-02-13 DIAGNOSIS — K25.4 GASTRIC ULCER WITH HEMORRHAGE, UNSPECIFIED CHRONICITY: ICD-10-CM

## 2023-02-13 LAB
25(OH)D3+25(OH)D2 SERPL-MCNC: 29.2 NG/ML (ref 30–100)
ALBUMIN SERPL-MCNC: 4.5 G/DL (ref 3.5–5.2)
ALBUMIN/GLOB SERPL: 2 G/DL
ALP SERPL-CCNC: 64 U/L (ref 39–117)
ALT SERPL-CCNC: 47 U/L (ref 1–41)
AST SERPL-CCNC: 23 U/L (ref 1–40)
BASOPHILS # BLD AUTO: 0.03 10*3/MM3 (ref 0–0.2)
BASOPHILS NFR BLD AUTO: 0.4 % (ref 0–1.5)
BILIRUB SERPL-MCNC: 0.7 MG/DL (ref 0–1.2)
BUN SERPL-MCNC: 11 MG/DL (ref 6–20)
BUN/CREAT SERPL: 12.8 (ref 7–25)
CALCIUM SERPL-MCNC: 9.4 MG/DL (ref 8.6–10.5)
CHLORIDE SERPL-SCNC: 107 MMOL/L (ref 98–107)
CHOLEST SERPL-MCNC: 208 MG/DL (ref 0–200)
CO2 SERPL-SCNC: 22 MMOL/L (ref 22–29)
CREAT SERPL-MCNC: 0.86 MG/DL (ref 0.76–1.27)
EGFRCR SERPLBLD CKD-EPI 2021: 118.7 ML/MIN/1.73
EOSINOPHIL # BLD AUTO: 0.35 10*3/MM3 (ref 0–0.4)
EOSINOPHIL NFR BLD AUTO: 5 % (ref 0.3–6.2)
ERYTHROCYTE [DISTWIDTH] IN BLOOD BY AUTOMATED COUNT: 12.7 % (ref 12.3–15.4)
GLOBULIN SER CALC-MCNC: 2.2 GM/DL
GLUCOSE SERPL-MCNC: 91 MG/DL (ref 65–99)
HCT VFR BLD AUTO: 47.3 % (ref 37.5–51)
HDLC SERPL-MCNC: 35 MG/DL (ref 40–60)
HGB BLD-MCNC: 16.8 G/DL (ref 13–17.7)
IMM GRANULOCYTES # BLD AUTO: 0.02 10*3/MM3 (ref 0–0.05)
IMM GRANULOCYTES NFR BLD AUTO: 0.3 % (ref 0–0.5)
LDLC SERPL CALC-MCNC: 152 MG/DL (ref 0–100)
LDLC/HDLC SERPL: 4.29 {RATIO}
LYMPHOCYTES # BLD AUTO: 2.15 10*3/MM3 (ref 0.7–3.1)
LYMPHOCYTES NFR BLD AUTO: 30.5 % (ref 19.6–45.3)
MCH RBC QN AUTO: 32.6 PG (ref 26.6–33)
MCHC RBC AUTO-ENTMCNC: 35.5 G/DL (ref 31.5–35.7)
MCV RBC AUTO: 91.8 FL (ref 79–97)
MONOCYTES # BLD AUTO: 0.48 10*3/MM3 (ref 0.1–0.9)
MONOCYTES NFR BLD AUTO: 6.8 % (ref 5–12)
NEUTROPHILS # BLD AUTO: 4.03 10*3/MM3 (ref 1.7–7)
NEUTROPHILS NFR BLD AUTO: 57 % (ref 42.7–76)
NRBC BLD AUTO-RTO: 0 /100 WBC (ref 0–0.2)
PLATELET # BLD AUTO: 193 10*3/MM3 (ref 140–450)
POTASSIUM SERPL-SCNC: 4.3 MMOL/L (ref 3.5–5.2)
PROT SERPL-MCNC: 6.7 G/DL (ref 6–8.5)
RBC # BLD AUTO: 5.15 10*6/MM3 (ref 4.14–5.8)
SODIUM SERPL-SCNC: 141 MMOL/L (ref 136–145)
TRIGL SERPL-MCNC: 115 MG/DL (ref 0–150)
TSH SERPL DL<=0.005 MIU/L-ACNC: 1.61 UIU/ML (ref 0.27–4.2)
VIT B12 SERPL-MCNC: 232 PG/ML (ref 211–946)
VLDLC SERPL CALC-MCNC: 21 MG/DL (ref 5–40)
WBC # BLD AUTO: 7.06 10*3/MM3 (ref 3.4–10.8)

## 2023-02-13 PROCEDURE — 99395 PREV VISIT EST AGE 18-39: CPT | Performed by: INTERNAL MEDICINE

## 2023-02-13 PROCEDURE — 71046 X-RAY EXAM CHEST 2 VIEWS: CPT

## 2023-02-13 PROCEDURE — 99213 OFFICE O/P EST LOW 20 MIN: CPT | Performed by: INTERNAL MEDICINE

## 2023-02-13 RX ORDER — DEXLANSOPRAZOLE 60 MG/1
60 CAPSULE, DELAYED RELEASE ORAL DAILY
Qty: 30 CAPSULE | Refills: 2 | Status: SHIPPED | OUTPATIENT
Start: 2023-02-13 | End: 2023-03-15

## 2023-02-13 NOTE — PROGRESS NOTES
"Subjective   Santosh Giordano is a 32 y.o. male and is here for a comprehensive physical exam. The patient reports problems - he has been having some epigastric pain  .    He ran out of his ppi a couple weeks ago and then started having some trouble with this epigastric.  He says it was not working as well later in the day anyway  He has had a cough since he had covid in ded  He occas coughs up colored mucous and it is some better      Do you take any herbs or supplements that were not prescribed by a doctor?       Social History: no tob no etoh  Social History     Socioeconomic History   • Marital status:      Spouse name: Lashawn Giordano   • Number of children: 0   Tobacco Use   • Smoking status: Light Smoker     Packs/day: 0.25     Years: 15.00     Pack years: 3.75     Types: Cigarettes     Start date: 2008   • Smokeless tobacco: Never   • Tobacco comments:     1 pack every 3 days    Vaping Use   • Vaping Use: Never used   Substance and Sexual Activity   • Alcohol use: Yes     Comment: donnie   • Drug use: No   • Sexual activity: Defer       Family History:   Family History   Problem Relation Age of Onset   • Heart failure Mother    • Kidney disease Mother    • Diabetes Mother    • Thyroid disease Father    • Heart disease Father    • Cancer Paternal Grandmother         breast   • Stroke Sister    • Seizures Sister        Past Medical History:   Past Medical History:   Diagnosis Date   • COVID-19     Patient states he had Covid the week before [place] 12/20 Had positive test.  States he has no symptoms.   • Headache            Review of Systems    A comprehensive review of systems was negative.    Objective   /70 (BP Location: Left arm, Patient Position: Sitting)   Pulse 92   Temp 96.6 °F (35.9 °C) (Infrared)   Ht 167.6 cm (65.98\")   Wt 112 kg (248 lb)   SpO2 96%   BMI 40.05 kg/m²     General Appearance:    Alert, cooperative, no distress, appears stated age   Head:    Normocephalic, without obvious " abnormality, atraumatic   Eyes:    PERRL, conjunctiva/corneas clear, EOM's intact, fundi     benign, both eyes   Ears:    Normal TM's and external ear canals, both ears   Nose:   Nares normal, septum midline, mucosa normal, no drainage    or sinus tenderness   Throat:   Lips, mucosa, and tongue normal; teeth and gums normal   Neck:   Supple, symmetrical, trachea midline, no adenopathy;     thyroid:  no enlargement/tenderness/nodules; no carotid    bruit or JVD   Back:     Symmetric, no curvature, ROM normal, no CVA tenderness   Lungs:     Clear to auscultation bilaterally, respirations unlabored   Chest Wall:    No tenderness or deformity    Heart:    Regular rate and rhythm, S1 and S2 normal, no murmur, rub   or gallop       Abdomen:     Soft, non-tender, bowel sounds active all four quadrants,     no masses, no organomegaly           Extremities:   Extremities normal, atraumatic, no cyanosis or edema   Pulses:   2+ and symmetric all extremities   Skin:   Skin color, texture, turgor normal, no rashes or lesions   Lymph nodes:   Cervical, supraclavicular, and axillary nodes normal   Neurologic:   CNII-XII intact, normal strength, sensation and reflexes     throughout       Vitals:    02/13/23 0955   BP: 122/70   Pulse: 92   Temp: 96.6 °F (35.9 °C)   SpO2: 96%     Body mass index is 40.05 kg/m².      Medications:   Current Outpatient Medications:   •  pantoprazole (PROTONIX) 40 MG EC tablet, Take 1 tablet by mouth Daily., Disp: 90 tablet, Rfl: 1  •  Probiotic Product (TRUBIOTICS PO), Take  by mouth., Disp: , Rfl:   •  albuterol sulfate  (90 Base) MCG/ACT inhaler, Inhale 2 puffs Every 4 (Four) Hours As Needed for Wheezing., Disp: 8 g, Rfl: 1       Assessment & Plan   Healthy male exam.      1. Healthcare Maintenance:  2. Patient Counseling:  --Nutrition: Stressed importance of moderation in sodium/caffeine intake, saturated fat and cholesterol, caloric balance, sufficient intake of fresh fruits, vegetables,  fiber, calcium and vit D  --Exercise:he has been exercising occas  --Substance Abuse: no tob no etoh  --Dental health: utd with dentist  --Immunizations reviewed  --Discussed benefits of screening colonoscopy.  3.  PUD with bleeding ulcers- he has been taking protonix am and pepcid in the jessica ing and still having issues  He has a hx of gastric ulcer with GI bleed  We will try dexilant 60mg a day  4.  Cough- s/p uri  Check cxr as he is a smoker   He is getting some better

## 2023-02-15 ENCOUNTER — TELEPHONE (OUTPATIENT)
Dept: INTERNAL MEDICINE | Facility: CLINIC | Age: 33
End: 2023-02-15
Payer: COMMERCIAL

## 2023-02-15 ENCOUNTER — TELEPHONE (OUTPATIENT)
Dept: INTERNAL MEDICINE | Facility: CLINIC | Age: 33
End: 2023-02-15

## 2023-02-15 NOTE — TELEPHONE ENCOUNTER
Caller: Santosh Giordano    Relationship: Self    Best call back number: [phone]    What test was performed: CT AND LABS    When was the test performed: 02/13    Where was the test performed: OFFICE    Additional notes: PLEASE ADVISE PATIENT ON RESULTS

## 2023-02-15 NOTE — TELEPHONE ENCOUNTER
Caller: Santosh Giordano    Relationship: Self    Best call back number: [phone]    What medications are you currently taking:   Current Outpatient Medications on File Prior to Visit   Medication Sig Dispense Refill   • albuterol sulfate  (90 Base) MCG/ACT inhaler Inhale 2 puffs Every 4 (Four) Hours As Needed for Wheezing. 8 g 1   • dexlansoprazole (Dexilant) 60 MG capsule Take 1 capsule by mouth Daily for 30 days. 30 capsule 2   • Probiotic Product (TRUBIOTICS PO) Take  by mouth.       No current facility-administered medications on file prior to visit.       What are your concerns: PATIENT STATES THAT DEXLANSOPRAZOLE WAS DENIED BY HIS INSURANCE, PHARMACY STATES THAT THEY HAVE SENT THIS BACK TO THE OFFICE VIA FAX. PLEASE ADVISE PATIENT ON NEXT STEPS.

## 2023-04-26 ENCOUNTER — TELEMEDICINE (OUTPATIENT)
Dept: FAMILY MEDICINE CLINIC | Facility: TELEHEALTH | Age: 33
End: 2023-04-26
Payer: COMMERCIAL

## 2023-04-26 DIAGNOSIS — R10.32 LEFT LOWER QUADRANT ABDOMINAL PAIN: Primary | ICD-10-CM

## 2023-04-26 DIAGNOSIS — M54.50 ACUTE LEFT-SIDED LOW BACK PAIN WITHOUT SCIATICA: ICD-10-CM

## 2023-04-27 ENCOUNTER — TELEPHONE (OUTPATIENT)
Dept: INTERNAL MEDICINE | Facility: CLINIC | Age: 33
End: 2023-04-27
Payer: COMMERCIAL

## 2023-04-27 RX ORDER — AMOXICILLIN AND CLAVULANATE POTASSIUM 875; 125 MG/1; MG/1
1 TABLET, FILM COATED ORAL 2 TIMES DAILY
Qty: 14 TABLET | Refills: 0 | Status: SHIPPED | OUTPATIENT
Start: 2023-04-27

## 2023-04-27 NOTE — TELEPHONE ENCOUNTER
RX SENT TO PHARMACY      ----- Message from Santosh Giordano sent at 4/27/2023  9:35 AM EDT -----  Regarding: Diverticulitis   Contact: [phone]  Thank you both so much! The pharmacy is [address], [place] [facility].

## 2023-04-27 NOTE — PROGRESS NOTES
You have chosen to receive care through a telehealth visit.  Do you consent to use a video/audio connection for your medical care today? Yes     CHIEF COMPLAINT  Chief Complaint   Patient presents with    Diverticulitis    Abdominal Pain         HPI  Santosh Giordano is a 32 y.o. male  presents with complaint of flare up of diverticulitis. Reports his last flare up was 6 months ago. Patient reports a long history of diverticulitis. Reports his symptoms started a couple days ago. Reports he is having left lower back pain and constipation. Denies any fever or chills. No nausea or vomiting. Left side pain reported. Has not taken anything for his symptoms.     Review of Systems   Constitutional:  Negative for chills, fatigue and fever.   HENT:  Negative for congestion, ear discharge, ear pain, sinus pressure, sinus pain and sore throat.    Respiratory:  Negative for cough, chest tightness, shortness of breath and wheezing.    Cardiovascular:  Negative for chest pain.   Gastrointestinal:  Positive for abdominal pain. Negative for diarrhea, nausea and vomiting.   Musculoskeletal:  Positive for back pain. Negative for myalgias.   Neurological:  Negative for dizziness.   Psychiatric/Behavioral: Negative.       Past Medical History:   Diagnosis Date    COVID-19     Patient states he had Covid the week before [place] 12/20 Had positive test.  States he has no symptoms.    Headache        Family History   Problem Relation Age of Onset    Heart failure Mother     Kidney disease Mother     Diabetes Mother     Thyroid disease Father     Heart disease Father     Cancer Paternal Grandmother         breast    Stroke Sister     Seizures Sister        Social History     Socioeconomic History    Marital status:      Spouse name: Lashawn Giordano    Number of children: 0   Tobacco Use    Smoking status: Light Smoker     Packs/day: 0.25     Years: 15.00     Pack years: 3.75     Types: Cigarettes     Start date: 2008    Smokeless tobacco:  Never    Tobacco comments:     1 pack every 3 days    Vaping Use    Vaping Use: Never used   Substance and Sexual Activity    Alcohol use: Yes     Comment: donnie    Drug use: No    Sexual activity: Defer       Santosh Giordano  reports that he has been smoking cigarettes. He started smoking about 15 years ago. He has a 3.75 pack-year smoking history. He has never used smokeless tobacco.. I have educated him on the risk of diseases from using tobacco products such as cancer, COPD, and heart disease.                   There were no vitals taken for this visit.    PHYSICAL EXAM  Physical Exam   Constitutional: He is oriented to person, place, and time. He appears well-developed and well-nourished. No distress.   HENT:   Head: Normocephalic and atraumatic.   Right Ear: Hearing normal.   Left Ear: Hearing normal.   Nose: No congestion.   Mouth/Throat: Mouth/Lips are normal.  Eyes: Conjunctivae and lids are normal.   Pulmonary/Chest: Effort normal.  No respiratory distress.  Abdominal: There is no abdominal tenderness in the left lower quadrant.   Patient directed exam   Neurological: He is alert and oriented to person, place, and time.   Psychiatric: He has a normal mood and affect. His speech is normal and behavior is normal.     Results for orders placed or performed in visit on 02/13/23   Comprehensive Metabolic Panel    Specimen: Blood   Result Value Ref Range    Glucose 91 65 - 99 mg/dL    BUN 11 6 - 20 mg/dL    Creatinine 0.86 0.76 - 1.27 mg/dL    EGFR Result 118.7 >60.0 mL/min/1.73    BUN/Creatinine Ratio 12.8 7.0 - 25.0    Sodium 141 136 - 145 mmol/L    Potassium 4.3 3.5 - 5.2 mmol/L    Chloride 107 98 - 107 mmol/L    Total CO2 22.0 22.0 - 29.0 mmol/L    Calcium 9.4 8.6 - 10.5 mg/dL    Total Protein 6.7 6.0 - 8.5 g/dL    Albumin 4.5 3.5 - 5.2 g/dL    Globulin 2.2 gm/dL    A/G Ratio 2.0 g/dL    Total Bilirubin 0.7 0.0 - 1.2 mg/dL    Alkaline Phosphatase 64 39 - 117 U/L    AST (SGOT) 23 1 - 40 U/L    ALT (SGPT) 47 (H)  1 - 41 U/L   LP+LDL / HDL Ratio ([facility])    Specimen: Blood   Result Value Ref Range    Total Cholesterol 208 (H) 0 - 200 mg/dL    Triglycerides 115 0 - 150 mg/dL    HDL Cholesterol 35 (L) 40 - 60 mg/dL    VLDL Cholesterol Pedro 21 5 - 40 mg/dL    LDL Chol Calc (NIH) 152 (H) 0 - 100 mg/dL    LDL/HDL RATIO 4.29    TSH Rfx On Abnormal To Free T4    Specimen: Blood   Result Value Ref Range    TSH 1.610 0.270 - 4.200 uIU/mL   Vitamin B12    Specimen: Blood   Result Value Ref Range    Vitamin B-12 232 211 - 946 pg/mL   Vitamin D,25-Hydroxy    Specimen: Blood   Result Value Ref Range    25 Hydroxy, Vitamin D 29.2 (L) 30.0 - 100.0 ng/ml   CBC & Differential    Specimen: Blood   Result Value Ref Range    WBC 7.06 3.40 - 10.80 10*3/mm3    RBC 5.15 4.14 - 5.80 10*6/mm3    Hemoglobin 16.8 13.0 - 17.7 g/dL    Hematocrit 47.3 37.5 - 51.0 %    MCV 91.8 79.0 - 97.0 fL    MCH 32.6 26.6 - 33.0 pg    MCHC 35.5 31.5 - 35.7 g/dL    RDW 12.7 12.3 - 15.4 %    Platelets 193 140 - 450 10*3/mm3    Neutrophil Rel % 57.0 42.7 - 76.0 %    Lymphocyte Rel % 30.5 19.6 - 45.3 %    Monocyte Rel % 6.8 5.0 - 12.0 %    Eosinophil Rel % 5.0 0.3 - 6.2 %    Basophil Rel % 0.4 0.0 - 1.5 %    Neutrophils Absolute 4.03 1.70 - 7.00 10*3/mm3    Lymphocytes Absolute 2.15 0.70 - 3.10 10*3/mm3    Monocytes Absolute 0.48 0.10 - 0.90 10*3/mm3    Eosinophils Absolute 0.35 0.00 - 0.40 10*3/mm3    Basophils Absolute 0.03 0.00 - 0.20 10*3/mm3    Immature Granulocyte Rel % 0.3 0.0 - 0.5 %    Immature Grans Absolute 0.02 0.00 - 0.05 10*3/mm3    nRBC 0.0 0.0 - 0.2 /100 WBC       Diagnoses and all orders for this visit:    1. Left lower quadrant abdominal pain (Primary)    2. Acute left-sided low back pain without sciatica      Advised patient to go to ER for in person evaluation. Understands he needs vitals and may need labs, radiology for diagnosis. Understands the risk and consequences of not going for in person evaluation.  Reports he is out of town but agrees to go to  urgent care or ER. Advised patient he needs to go to ER.     Mimi Telles, APRN  04/26/2023  22:36 EDT    The use of a video visit has been reviewed with the patient and verbal informed consent has been obtained. Myself and Santosh Giordano participated in this visit. The patient is located in [address] [place] [address].    I am located in [place], [place]. "[employer]t and [employer] were utilized. I spent 5 minutes in the patient's chart for this visit.

## 2023-05-22 RX ORDER — DEXLANSOPRAZOLE 60 MG/1
60 CAPSULE, DELAYED RELEASE ORAL DAILY
Qty: 90 CAPSULE | Refills: 1 | Status: SHIPPED | OUTPATIENT
Start: 2023-05-22

## (undated) DEVICE — CANN O2 ETCO2 FITS ALL CONN CO2 SMPL A/ 7IN DISP LF

## (undated) DEVICE — KT ORCA ORCAPOD DISP STRL

## (undated) DEVICE — ADAPT CLN BIOGUARD AIR/H2O DISP

## (undated) DEVICE — LN SMPL CO2 SHTRM SD STREAM W/M LUER

## (undated) DEVICE — THE CARR-LOCKE INJECTION NEEDLE IS A SINGLE USE, DISPOSABLE, FLEXIBLE SHEATH INJECTION NEEDLE USED FOR THE INJECTION OF VARIOUS TYPES OF MEDIA THROUGH FLEXIBLE ENDOSCOPES.

## (undated) DEVICE — Device

## (undated) DEVICE — SENSR O2 OXIMAX FNGR A/ 18IN NONSTR

## (undated) DEVICE — BITEBLOCK OMNI BLOC

## (undated) DEVICE — CANN NASL CO2 TRULINK W/O2 A/

## (undated) DEVICE — TUBING, SUCTION, 1/4" X 10', STRAIGHT: Brand: MEDLINE

## (undated) DEVICE — SINGLE-USE BIOPSY FORCEPS: Brand: RADIAL JAW 4

## (undated) DEVICE — BIPOLAR ELECTROHEMOSTASIS CATHETER: Brand: INJECTION GOLD PROBE

## (undated) DEVICE — FRCP BX RADJAW4 NDL 2.8 240CM LG OG BX40

## (undated) DEVICE — GOWN ISOL W/THUMB UNIV BLU BX/15